# Patient Record
Sex: FEMALE | Race: WHITE | Employment: OTHER | URBAN - METROPOLITAN AREA
[De-identification: names, ages, dates, MRNs, and addresses within clinical notes are randomized per-mention and may not be internally consistent; named-entity substitution may affect disease eponyms.]

---

## 2021-12-03 ENCOUNTER — HOSPITAL ENCOUNTER (OUTPATIENT)
Facility: HOSPITAL | Age: 66
Setting detail: OBSERVATION
Discharge: HOME | End: 2021-12-05
Attending: EMERGENCY MEDICINE | Admitting: INTERNAL MEDICINE
Payer: MEDICARE

## 2021-12-03 ENCOUNTER — APPOINTMENT (OUTPATIENT)
Dept: CT IMAGING | Facility: HOSPITAL | Age: 66
End: 2021-12-03
Attending: EMERGENCY MEDICINE
Payer: MEDICARE

## 2021-12-03 DIAGNOSIS — R47.81 SLURRED SPEECH: ICD-10-CM

## 2021-12-03 DIAGNOSIS — R20.0 LEFT FACIAL NUMBNESS: Primary | ICD-10-CM

## 2021-12-03 PROCEDURE — 70450 CT HEAD/BRAIN W/O DYE: CPT | Performed by: EMERGENCY MEDICINE

## 2021-12-04 ENCOUNTER — APPOINTMENT (OUTPATIENT)
Dept: MRI IMAGING | Facility: HOSPITAL | Age: 66
End: 2021-12-04
Attending: EMERGENCY MEDICINE
Payer: MEDICARE

## 2021-12-04 ENCOUNTER — APPOINTMENT (OUTPATIENT)
Dept: CT IMAGING | Facility: HOSPITAL | Age: 66
End: 2021-12-04
Attending: Other
Payer: MEDICARE

## 2021-12-04 PROBLEM — R47.81 SLURRED SPEECH: Status: ACTIVE | Noted: 2021-12-04

## 2021-12-04 PROCEDURE — 99223 1ST HOSP IP/OBS HIGH 75: CPT | Performed by: OTHER

## 2021-12-04 PROCEDURE — 70553 MRI BRAIN STEM W/O & W/DYE: CPT | Performed by: EMERGENCY MEDICINE

## 2021-12-04 PROCEDURE — 70498 CT ANGIOGRAPHY NECK: CPT | Performed by: OTHER

## 2021-12-04 PROCEDURE — 70496 CT ANGIOGRAPHY HEAD: CPT | Performed by: OTHER

## 2021-12-04 RX ORDER — DOCUSATE SODIUM 100 MG/1
100 CAPSULE, LIQUID FILLED ORAL 2 TIMES DAILY PRN
Status: DISCONTINUED | OUTPATIENT
Start: 2021-12-04 | End: 2021-12-05

## 2021-12-04 RX ORDER — ACETAMINOPHEN 325 MG/1
650 TABLET ORAL EVERY 6 HOURS PRN
Status: DISCONTINUED | OUTPATIENT
Start: 2021-12-04 | End: 2021-12-05

## 2021-12-04 RX ORDER — ATORVASTATIN CALCIUM 20 MG/1
20 TABLET, FILM COATED ORAL NIGHTLY
Status: ON HOLD | COMMUNITY
End: 2021-12-05

## 2021-12-04 RX ORDER — ASPIRIN 325 MG
325 TABLET ORAL NIGHTLY
Status: DISCONTINUED | OUTPATIENT
Start: 2021-12-04 | End: 2021-12-04

## 2021-12-04 RX ORDER — MIDAZOLAM HYDROCHLORIDE 10 MG/2ML
2.5 INJECTION, SOLUTION INTRAMUSCULAR; INTRAVENOUS ONCE
Status: COMPLETED | OUTPATIENT
Start: 2021-12-04 | End: 2021-12-04

## 2021-12-04 RX ORDER — ASPIRIN 300 MG
300 SUPPOSITORY, RECTAL RECTAL NIGHTLY
Status: DISCONTINUED | OUTPATIENT
Start: 2021-12-04 | End: 2021-12-04

## 2021-12-04 RX ORDER — ACETAMINOPHEN 650 MG/1
650 SUPPOSITORY RECTAL EVERY 4 HOURS PRN
Status: DISCONTINUED | OUTPATIENT
Start: 2021-12-04 | End: 2021-12-05

## 2021-12-04 RX ORDER — METOCLOPRAMIDE HYDROCHLORIDE 5 MG/ML
10 INJECTION INTRAMUSCULAR; INTRAVENOUS EVERY 8 HOURS PRN
Status: DISCONTINUED | OUTPATIENT
Start: 2021-12-04 | End: 2021-12-05

## 2021-12-04 RX ORDER — HYDRALAZINE HYDROCHLORIDE 20 MG/ML
10 INJECTION INTRAMUSCULAR; INTRAVENOUS EVERY 2 HOUR PRN
Status: DISCONTINUED | OUTPATIENT
Start: 2021-12-04 | End: 2021-12-05

## 2021-12-04 RX ORDER — ASPIRIN 81 MG/1
81 TABLET, CHEWABLE ORAL DAILY
Status: DISCONTINUED | OUTPATIENT
Start: 2021-12-05 | End: 2021-12-05

## 2021-12-04 RX ORDER — EMPAGLIFLOZIN 25 MG/1
25 TABLET, FILM COATED ORAL DAILY
COMMUNITY

## 2021-12-04 RX ORDER — ACETAMINOPHEN 325 MG/1
650 TABLET ORAL EVERY 4 HOURS PRN
Status: DISCONTINUED | OUTPATIENT
Start: 2021-12-04 | End: 2021-12-05

## 2021-12-04 RX ORDER — DEXTROSE MONOHYDRATE 25 G/50ML
50 INJECTION, SOLUTION INTRAVENOUS
Status: DISCONTINUED | OUTPATIENT
Start: 2021-12-04 | End: 2021-12-05

## 2021-12-04 RX ORDER — SODIUM CHLORIDE 9 MG/ML
INJECTION, SOLUTION INTRAVENOUS CONTINUOUS
Status: DISCONTINUED | OUTPATIENT
Start: 2021-12-04 | End: 2021-12-04

## 2021-12-04 RX ORDER — ONDANSETRON 2 MG/ML
4 INJECTION INTRAMUSCULAR; INTRAVENOUS EVERY 6 HOURS PRN
Status: DISCONTINUED | OUTPATIENT
Start: 2021-12-04 | End: 2021-12-05

## 2021-12-04 RX ORDER — ATORVASTATIN CALCIUM 80 MG/1
80 TABLET, FILM COATED ORAL NIGHTLY
Status: DISCONTINUED | OUTPATIENT
Start: 2021-12-04 | End: 2021-12-05

## 2021-12-04 RX ORDER — ASPIRIN 81 MG/1
81 TABLET, CHEWABLE ORAL DAILY
Refills: 0 | Status: SHIPPED | COMMUNITY
Start: 2021-12-05 | End: 2021-12-05

## 2021-12-04 RX ORDER — LABETALOL HYDROCHLORIDE 5 MG/ML
10 INJECTION, SOLUTION INTRAVENOUS EVERY 10 MIN PRN
Status: DISCONTINUED | OUTPATIENT
Start: 2021-12-04 | End: 2021-12-05

## 2021-12-04 NOTE — H&P
[facility] Hospitalist H&P       CC: numbness, weakness     PCP: Villa Anaya MD    History of Present Illness:   77year old with history of diabetes mellitus type 2 on oral hypoglycemics, hyperlipidemia, hx of hypertension but now off me distress  Neck: non tender, no adenopathy   Lungs: clear to ausculation bilaterally, no wheezing  Heart: Regular rate and rhythm  Abdomen: soft, non tender, non distended   Extremities: No edema  Skin: no new rash, normal color  Neuro: CN II-XII intact, 5/ hypertension but now off medications, who presents for evaluation of left facial numbness, possible slurred speech, left arm parasthesias, but also right arm as well, and bilateral LE.      Parasthesias, however multi-focal and not one sided  -after further

## 2021-12-04 NOTE — PLAN OF CARE
Shy Purcell is alert and oriented. Admission completed. On RA. Voiding freely. Headache pain complaints this am, aspirin was given earlier with good results. IVF running per order.  Neuros q2H, no deficits besides tingling on left side of face and left leg and seizures  Description: INTERVENTIONS  - Monitor for seizure activity  - Administer anti-seizure medications as ordered  - Monitor neurological status  Outcome: Progressing  Goal: Remains free of injury related to seizure activity  Description: INTERVENTION

## 2021-12-04 NOTE — OCCUPATIONAL THERAPY NOTE
OCCUPATIONAL THERAPY EVALUATION - INPATIENT     Room Number: 340/340-A  Evaluation Date: 12/4/2021  Type of Evaluation: Initial  Presenting Problem: L facial numnbess    Physician Order: IP Consult to Occupational Therapy  Reason for Therapy: ADL/IADL Dysf Activity Short Form. Research supports that patients with this level of impairment may benefit from out patient OT.      DISCHARGE RECOMMENDATIONS  OT Discharge Recommendations: Outpatient OT;24 hour care/supervision  OT Device Recommendations: TBD    PLAN Perception Status:   WFL - within functional limits    SENSATION  Pt able to note when touched BUE; reported parestheias as described above    Communication: Pt was able to answer questions and provide hx    Behavioral/Emotional/Social: Pt cont' to concern within reach;RN aware of session/findings; All patient questions and concerns addressed; Alarm set    OT Goals  Patients self stated goal is: return home     Patient will complete functional transfer with mod I  Comment:     Patient will complete LE dressing

## 2021-12-04 NOTE — ED PROVIDER NOTES
Patient Seen in: [facility] AND [facility] Emergency Department    History   Patient presents with:  Numbness Weakness      HPI    51-year-old female presents the ER with complaint of left facial numbness and tingling and slurring of her speech that started at 5 P mask, eye protection, and gloves were worn throughout the duration of the exam.  Handwashing was performed prior to and after the exam.  Stethoscope and any equipment used during my examination was cleaned with super sani-cloth germicidal wipes following t Abnormal; Notable for the following components:    HgbA1C 7.3 (*)     Estimated Average Glucose 163 (*)     All other components within normal limits   POCT GLUCOSE - Abnormal; Notable for the following components:    POC Glucose  130 (*)     All other com individual orders. SCAN SLIDE   MD BLOOD SMEAR CONSULT   RAINBOW DRAW GOLD     EKG    Rate, intervals and axes as noted on EKG Report. Rate: 78  Rhythm: Sinus Rhythm  Reading: No ST deviation.              Imaging Results Available and Reviewed while in The patient already has does not have any pertinent problems on file. to contribute to the complexity of this ED evaluation.     ED Course: 75-year-old female presents the ER with complaint of left-sided facial tingling and slurring of her speech which is s

## 2021-12-04 NOTE — PHYSICAL THERAPY NOTE
PHYSICAL THERAPY EVALUATION - INPATIENT     Room Number: 340/340-A  Evaluation Date: 12/4/2021  Type of Evaluation: Initial   Physician Order: PT Eval and Treat    Presenting Problem: L facial numbness     Reason for Therapy: Mobility Dysfunction and Disc Speech normal but feels slow in cog, motor function. MRI, CT show old CVA. Problem List  Principal Problem:    Left facial numbness  Active Problems:    Slurred speech      Past Medical History  History reviewed. No pertinent past medical history. How much help from another person does the patient currently need. ..    Help from Another: Moving to and from a bed to a chair (including a wheelchair)?: None   Help from Another: Need to walk in hospital room?: None   Help from Another: Climbing 3-5 step

## 2021-12-04 NOTE — CONSULTS
[facility]  [address] [facility]  [phone]          [facility] [address]    Report of Consultation    Gricel Horse Patient Status:  Inpatient     1955 MRN [id number] slowly. She had \"sharp pain on the top of her head. \"  She had \"pinchingness\" on the left occipital region, left temporal, and left orbital.  daughter reports a left ptosis. She reports a dull pain that never goes away.      She had a headache for ab H&P    Prior stroke/TIAs (date, description):       Date  Topography/symptoms  Etiology      Vascular Risk Factors:   Diabetes mellitus  Dyslipidemia  Hypertension  Physical inactivity      ROS:  Pertinent positive and negatives per HPI.   All others were r @FLOWCHS(14)@    Exam:  - General: appears stated age and no distress  - CV: Normal S1-S2   Carotids:   - Pulmonary: no signs of respiratory distress. Normal excursion of the chest.   Neurologic Exam  - Mental Status: Alert and attentive.  person, place release signs:Not assessed.     Jaw Jerk:    Wilmer's sign:absent   Nonsustained clonus: Absent   Sustained clonus: Absent   - Sensory:   Light touch: normal  Temperature: normal  Pinprick:   Vibration: normal  Proprioception:      Sensory level:      Rom right greater than left-sided paresthesias, headache, reported left ptosis, and slow speech found to have subacute infarct in her left temporoparietal region and left sylvian fissure/operculum.   Currently her exam is nonfocal.  She denies having a headache 80; could be on atorvastatin 20; LDL is < 70.  3. BP goal:   4. Permissive hypertension. Treat systolic blood pressures unless > 220/110  5. Additional brain and vascular imaging ordered:   a.  CTA  Head/neck  b. TTE  c. EEG  6.  Additional labs ordered: support charting by exception. Sections left blank in a completed note should be presumed not to have been done.     Level of complexity was based on - interviewing, examining the patient, establishing a plan of care, as well as review of all neuroimaging

## 2021-12-04 NOTE — ED QUICK NOTES
Orders for admission, patient is aware of plan and ready to go upstairs. Any questions, please call ED RN racheal  at extension [phone].    Type of COVID test sent: rapid  COVID Suspicion level: Low    LOC at time of transport: a/o x3/3    Other pertinent inform

## 2021-12-04 NOTE — SLP NOTE
ADULT SWALLOWING EVALUATION    ASSESSMENT    ASSESSMENT/OVERALL IMPRESSION:    Proper PPE worn. Hands sanitized upon entrance/exit Pt room. This BSE was ordered d/t Pt admitted with (L) facial numbness and report of \"slurred speech. \". Pt on solid/t Sp02 was ~97 % during this assessment. IMPRESSIONS:    Pt presents with functional oral swallow and possible pharyngeal dysfunction. Per [facility] JULIANNA Scale, Pt's swallow function is Level 7 of 7 (f/u required for reinforcement of swallowing precautions).  Col Functional Limits  Range of Motion: Within Functional Limits  Rate of Motion: Within Functional Limits    Voice Quality: Clear  Respiratory Status: Unlabored (room air)  Consistencies Trialed: Thin liquids; Hard solid  Method of Presentation: Self presentat

## 2021-12-05 ENCOUNTER — APPOINTMENT (OUTPATIENT)
Dept: CV DIAGNOSTICS | Facility: HOSPITAL | Age: 66
End: 2021-12-05
Attending: HOSPITALIST
Payer: MEDICARE

## 2021-12-05 VITALS
OXYGEN SATURATION: 96 % | TEMPERATURE: 98 F | HEART RATE: 65 BPM | SYSTOLIC BLOOD PRESSURE: 124 MMHG | WEIGHT: 150.81 LBS | DIASTOLIC BLOOD PRESSURE: 60 MMHG | RESPIRATION RATE: 14 BRPM | BODY MASS INDEX: 25.75 KG/M2 | HEIGHT: 64 IN

## 2021-12-05 PROCEDURE — 93306 TTE W/DOPPLER COMPLETE: CPT | Performed by: HOSPITALIST

## 2021-12-05 PROCEDURE — 99233 SBSQ HOSP IP/OBS HIGH 50: CPT | Performed by: OTHER

## 2021-12-05 RX ORDER — ATORVASTATIN CALCIUM 80 MG/1
80 TABLET, FILM COATED ORAL NIGHTLY
Qty: 90 TABLET | Refills: 0 | Status: SHIPPED | OUTPATIENT
Start: 2021-12-05

## 2021-12-05 RX ORDER — ASPIRIN 81 MG/1
81 TABLET, CHEWABLE ORAL DAILY
Qty: 90 TABLET | Refills: 0 | Status: SHIPPED | OUTPATIENT
Start: 2021-12-05

## 2021-12-05 RX ORDER — CLOPIDOGREL BISULFATE 75 MG/1
75 TABLET ORAL DAILY
Qty: 90 TABLET | Refills: 0 | Status: SHIPPED | OUTPATIENT
Start: 2021-12-05 | End: 2021-12-24

## 2021-12-05 RX ORDER — CLOPIDOGREL BISULFATE 75 MG/1
75 TABLET ORAL DAILY
Status: DISCONTINUED | OUTPATIENT
Start: 2021-12-05 | End: 2021-12-05

## 2021-12-05 NOTE — PROGRESS NOTES
Initially was convinced that the patient would need a carotid enterectomy or carotid stent. However, given how old her prior infarcts are she may be a candidate for medical management.     I will see her in clinic the week of the 13th through The 17th and

## 2021-12-05 NOTE — CONSULTS
Fareed Alvarez MD.  [facility]   Vascular and Endovascular Surgery     VASCULAR SURGERY   CONSULT NOTE      Name: Shabbir Rogers   :     [id number]     2021       REFERRING PHYSICIAN: Mejia Love DO  PRIMARY CARE PHYSICIAN: acetaminophen (TYLENOL) tab 650 mg, 650 mg, Oral, Q6H PRN  •  glucose (DEX4) oral liquid 15 g, 15 g, Oral, Q15 Min PRN **OR** glucose-vitamin C (DEX-4) chewable tab 4 tablet, 4 tablet, Oral, Q15 Min PRN **OR** dextrose 50 % injection 50 mL, 50 mL, Intraven    CO2 27.0   BUN 11   CREATSERUM 0.88   *   CA 9.9     Recent Labs   Lab 12/03/21  2250   PTP 12.3   INR 0.93   PTT 28.7     No results for input(s): ALT, AST, ALB, AMYLASE, LIPASE, LDH in the last 168 hours.     Invalid input(s): JOSE TB hemorrhage. 2. Chronic left posterior parietal occipital cortical infarct. 3. Chronic left basal ganglionic lacunar infarct related to small vessel disease. 4. Mild changes of chronic small vessel disease in cerebral white matter.  5. Large vessel intracran left parietal lobe cortical infarcts and small chronic left posterior temporal operculum infarct. 3. Mild changes of chronic small vessel disease in cerebral white matter. 4. Chronic left basal ganglionic lacunar infarcts related to small vessel disease.  5 stenosis. No visible aneurysm or vascular malformation.   OTHER FINDINGS:    Chronic ischemic change and volume loss/encephalomalacia in the posterior-lateral aspect of the left temporal-occipital lobe adjacent to the lateral ventricle similar to prior MR 1. Left ventricle: The cavity size was normal. Wall thickness was    normal. Systolic function was normal. The estimated ejection    fraction was 55-60%, by visual assessment. Wall motion was    normal; there were no regional wall motion abnormalities.  Lef regurgitation. Peak gradient (D): 3mm Hg. Left atrium:  The atrium was normal in size. Right ventricle: The cavity size was normal. Wall thickness was normal. Systolic function was normal. Pulmonic valve: Well visualized.   The valve appears to be soto Aorta                                     Value        Reference  Aortic root ID                            3.0   cm     &lt;4.0  Aortic root ID, STJ, ED                   2.6   cm     2.0 - 3.2  Descending aorta ID                       2.7   cm     ----- 12/5/2021  PROCEDURE: CTA CAROTID ARTERIES (CPT=70498)  COMPARISON: None. INDICATIONS: left ICA stenosis.   TECHNIQUE:   CT images of the neck were obtained with non-ionic intravenous contrast material. Multi-planar reformatted/3-D images were created to o aortic arch with aneurysm or dissection. MEDIASTINUM/APICES (Limited): Minimal dependent subsegmental atelectasis in the lung bases. OTHER: The visualized soft tissues of the neck are also unremarkable. CONCLUSION:  1.   Atherosclerotic plaq Posterior Auricular Interpolation Flap Text: A decision was made to reconstruct the defect utilizing an interpolation axial flap and a staged reconstruction.  A telfa template was made of the defect.  This telfa template was then used to outline the posterior auricular interpolation flap.  The donor area for the pedicle flap was then injected with anesthesia.  The flap was excised through the skin and subcutaneous tissue down to the layer of the underlying musculature.  The pedicle flap was carefully excised within this deep plane to maintain its blood supply.  The edges of the donor site were undermined.   The donor site was closed in a primary fashion.  The pedicle was then rotated into position and sutured.  Once the tube was sutured into place, adequate blood supply was confirmed with blanching and refill.  The pedicle was then wrapped with xeroform gauze and dressed appropriately with a telfa and gauze bandage to ensure continued blood supply and protect the attached pedicle.

## 2021-12-05 NOTE — PLAN OF CARE
Dori Huff is resting comfortably in bed, alert and oriented x 4, on room air, on tele, Accucheck ACHS, ambulating, headaches controlled with acetaminophen, neuro checks q4h, SCDS for DVT prophylaxis, IV fluids discontinued, plan for 2D echo tomorrow and CTA, interventions as ordered  Outcome: Progressing  Goal: Absence of seizures  Description: INTERVENTIONS  - Monitor for seizure activity  - Administer anti-seizure medications as ordered  - Monitor neurological status  Outcome: Progressing  Goal: Remains free

## 2021-12-05 NOTE — PROGRESS NOTES
[facility]  [address] [facility], [address] [place] [address]  [phone]        INPATIENT STROKE NEUROLOGY   FOLLOW UP PROGRESS NOTE  [address]RAY[address] Khalil Patient Status:  Inpatient     1955 MRN  carotid stent. However, given how old her prior infarcts are she may be a candidate for medical management.   I spent approximately an hour in the room with the family reviewing the evidence about carotid endarterectomy performed outside of 2 weeks of the for 90 days for a potentially active/dynamic source of stroke  7. Lipid-lowering agents: Atorvastatin   8. Avoid hypotonic fluids as this can worsen cerebral edema. 9. Physical therapy, Occupational Therapy, speech therapy evaluations ordered.   Dhiraj Patterson speech. pertinent positive and negatives per HPI. All others were reviewed and negative. Objective   OBJECTIVE:   Last vitals and weight :  Blood pressure 124/60, pulse 65, temperature 97.9 °F (36.6 °C), temperature source Oral, resp.  rate 14, dominant hand is slightly faster. Foot Taps: Foot taps are symmetric. Leg Drift:      Reflexes:    C5 C6 C7  L4 S1   R 0 0  0 0   L 0 0  0 0   Adductor Spread: No adductor spread noted.      Nonsustained clonus: Absent   Sustained clonus: Absent   Minus Favor 12.3 11.8 - 14.5 seconds    INR 0.93 0.90 - 1.20   PTT, Activated    Collection Time: 12/03/21 10:50 PM   Result Value Ref Range    PTT 28.7 23.2 - 35.3 seconds   CBC W/ DIFFERENTIAL    Collection Time: 12/03/21 10:50 PM   Result Value Ref Range    WBC 10. left-to-right cardiac shunt, renal disease (hydronephrosis, cystic renal disease), several tumors (uterine leiomyoma, adenocarcinoma of the kidney, hepatocellular carcinoma, pheochromocytoma, cerebellar hemangioblastoma), increased plasma volume and myelop Value Ref Range    Cholesterol, Total 154 <200 mg/dL    HDL Cholesterol 76 (H) 40 - 59 mg/dL    Triglycerides 69 30 - 149 mg/dL    LDL Cholesterol 64 <100 mg/dL    VLDL 10 0 - 30 mg/dL    Non HDL Chol 78 <130 mg/dL   POCT Glucose    Collection Time: 12/04/ (UJK=[id number])    Result Date: 12/5/2021  CONCLUSION:  1. Moderate-severe atherosclerotic narrowing in the left internal carotid artery. Otherwise no he medically significant stenosis, occlusion, or gross aneurysm of the anterior or posterior circulation.  2 to note above. Evaluation/Outcome: Verbalized understanding    Disclaimer: This record was dictated using [address]oit. There may be errors due to voice recognition problems that were not realized and corrected during the completion of the note.

## 2021-12-05 NOTE — PLAN OF CARE
Assumed care from [facility]. Pt a/ox4 no complaints of pain. Ambulating independently. Continuing neuro assessments per protocol. Per vascular surgery and neuro, patient cleared for discharge.  Education complete, discharge instructions gone over with sidra Progressing  Goal: Absence of seizures  Description: INTERVENTIONS  - Monitor for seizure activity  - Administer anti-seizure medications as ordered  - Monitor neurological status  Outcome: Progressing  Goal: Remains free of injury related to seizure activ

## 2021-12-05 NOTE — PLAN OF CARE
Patient alert and oriented, no acute events overnight. Educated patient to call for assistance, call light within reach.     Problem: Patient Centered Care  Goal: Patient preferences are identified and integrated in the patient's plan of care  Description: related to seizure activity  Description: INTERVENTIONS:  - Maintain airway, patient safety  and administer oxygen as ordered  - Monitor patient for seizure activity, document and report duration and description of seizure to MD/LIP  - If seizure occurs, t

## 2021-12-05 NOTE — PROGRESS NOTES
[facility] Hospitalist Progress Note     CC: Hospital Follow up    PCP: Demarcus Arreola MD       Assessment/Plan:   77year old with history of diabetes mellitus type 2 on oral hypoglycemics, hyperlipidemia, hx of hypertension but now off medic Weights  12/05/21 0701 : 150 lb 12.8 oz (68.4 kg)  12/04/21 0242 : 154 lb 14.4 oz (70.3 kg)  12/03/21 2246 : 160 lb (72.6 kg)  12/03/21 2358 : 160 lb 0.9 oz (72.6 kg)      /82 (BP Location: Right arm)   Pulse 71   Temp 98.3 °F (36.8 °C) (Oral)   Resp vessel disease in cerebral white matter. 4. Chronic left basal ganglionic lacunar infarcts related to small vessel disease. 5. Narrowing of left cavernous carotid artery related to densely calcified plaque.  6. No major discrepancy with preliminary Vision r Labetalol HCl, hydrALAzine HCl, docusate sodium, ondansetron **OR** metoclopramide, acetaminophen, glucose **OR** glucose-vitamin C **OR** dextrose **OR** glucose **OR** glucose-vitamin C, iopamidol    Roxanna Gamboa DO

## 2021-12-05 NOTE — PLAN OF CARE
Francis Catalan is resting in bed, alert and oriented x 4, on room air, ambulating, voiding freely, Accucheck ACHS, neuro q4h, c/o mild pressure on head, PRN acetaminophen adx, fall precautions in place, call light within easy reach, 2D Echo completed today, plan seizures  Description: INTERVENTIONS  - Monitor for seizure activity  - Administer anti-seizure medications as ordered  - Monitor neurological status  Outcome: Progressing  Goal: Remains free of injury related to seizure activity  Description: INTERVENTION

## 2021-12-06 ENCOUNTER — TELEPHONE (OUTPATIENT)
Dept: NEUROLOGY | Facility: CLINIC | Age: 66
End: 2021-12-06

## 2021-12-06 NOTE — DISCHARGE SUMMARY
General Medicine Discharge Summary     Patient ID:  Alysa Jamison  77year old  8/18/1955    Admit date: 12/3/2021    Discharge date and time: 12/5/2021  6:55 PM     Attending Physician: Jori Jeronimo mellitus type 2  -resume her oral hypoglycemics  -HbA1c 7.3%     Hypertension  -was taken off meds about a year or two ago she states  -BP here has been stable    Consults:   IP CONSULT TO NEUROLOGY  IP CONSULT TO VASCULAR SURGERY    Patient instructions:

## 2021-12-07 NOTE — PAYOR COMM NOTE
--------------  ADMISSION REVIEW     Payor: [facility] [place] #:  [id number]  Authorization Number: [id number]       ED Provider Notes        History   Patient presents with:  Numbness Weakness      HPI    15-year-old female presents th Status: She is alert and oriented to person, place, and time. Cranial Nerves: No cranial nerve deficit, dysarthria or facial asymmetry. Sensory: No sensory deficit. Motor: No weakness.       Deep Tendon Reflexes: Reflexes are normal and symme differentiation and edema, potential representing a new (acute versus subacute) ischemic infarct (series 2, image 20 and 23). Recommend further evaluation with brain MRI.     ED Medications Administered: Medications - No data to display      MDM      12/03 yesterday around 500pm. She first went to the immediate care center, then was told to go to the ER. She denies any trouble swallowing. No vision changes.  When asked first about her symptoms she described more of the facial symptoms, the whole left side, bu headache currently, but had a headache yesterday. Overall her exam is nonfocal.  She has diffuse hyporeflexia and questionable left drift.     Etiology of her left-sided paresthesias is most likely due to migraine aura.   Her new headaches been ongoing for a. LDL and A1c  7. Dysphagia status:   a. Based on RN swallow eval.  8. Fluids/nutrition:   a. Avoid hypotonic fluids. 9. DVT prophylaxis:   a. Chemical ppx w/ lovenox and scds  10.  Therapy/rehab services ordered (speech/PT/OT/rehab consult):   a. Order need a carotid enterectomy or carotid stent.   However, given how old her prior infarcts are she may be a candidate for medical management.     I will see her in clinic the week of the 13th through The 17th and at that time we can discuss whether she  Shoul

## 2021-12-14 ENCOUNTER — OFFICE VISIT (OUTPATIENT)
Dept: NEUROLOGY | Facility: CLINIC | Age: 66
End: 2021-12-14
Payer: COMMERCIAL

## 2021-12-14 VITALS
SYSTOLIC BLOOD PRESSURE: 130 MMHG | OXYGEN SATURATION: 99 % | BODY MASS INDEX: 25.61 KG/M2 | WEIGHT: 150 LBS | HEIGHT: 64 IN | DIASTOLIC BLOOD PRESSURE: 72 MMHG | HEART RATE: 70 BPM

## 2021-12-14 DIAGNOSIS — I65.22 STENOSIS OF LEFT INTERNAL CAROTID ARTERY: Primary | ICD-10-CM

## 2021-12-14 PROCEDURE — 3078F DIAST BP <80 MM HG: CPT | Performed by: OTHER

## 2021-12-14 PROCEDURE — 3075F SYST BP GE 130 - 139MM HG: CPT | Performed by: OTHER

## 2021-12-14 PROCEDURE — 3008F BODY MASS INDEX DOCD: CPT | Performed by: OTHER

## 2021-12-14 PROCEDURE — 99214 OFFICE O/P EST MOD 30 MIN: CPT | Performed by: OTHER

## 2021-12-14 PROCEDURE — 1111F DSCHRG MED/CURRENT MED MERGE: CPT | Performed by: OTHER

## 2021-12-15 NOTE — PROGRESS NOTES
[facility] 37  [address]  [phone]        Neurology Clinic Follow Up Note    Chief Complaint:  Neurologic Problem (New patient here due to ER Visit on 12/3 for left facial numbness and slurred may have similar benefit from medical management alone without the risk of surgical intervention. While she was admitted I discussed this with the patient and her children for approximately an hour.   She now presents in follow-up to further evaluate the n Mental Status: Alert and attentive. Oriented x4. Speech is spontaneous, fluent, and prosodic. Comprehension and repetition intact.  Phrase length and rate are normal. No paraphasic errors, neologisms, anomia, acalculia, apraxia, anosognosia, or R/L confus neck  Imaging revealed:   Approximately 75% stenosis in the left ICA. There is decreased flow throughout the course of the left ICA. 55 to 70% stenosis in the right ICA.   Subacute to chronic infarcts/T2 flair hyperintensities in the left MCA internal bor 130/80. HbA1c goal <7.0  LDL-C goal  <70 mg/dL. Frequent exercise as per AHA/ASA recs (30 min of aerobic exercise, 5 times per week). Given that she snores consider sleep study at next visit. Recommend DASH diet.  A diet that is rich in fruits and vege of care on the above. Thank you for allowing me to participate in the care of your patient.     Diogo Dubon DO  12/14/2021

## 2021-12-19 ENCOUNTER — LAB ENCOUNTER (OUTPATIENT)
Dept: LAB | Facility: HOSPITAL | Age: 66
DRG: 039 | End: 2021-12-19
Attending: SURGERY
Payer: MEDICARE

## 2021-12-19 DIAGNOSIS — Z01.818 PREOPERATIVE TESTING: ICD-10-CM

## 2021-12-19 PROCEDURE — 87641 MR-STAPH DNA AMP PROBE: CPT

## 2021-12-20 LAB
MRSA DNA SPEC QL NAA+PROBE: NEGATIVE
SARS-COV-2 RNA RESP QL NAA+PROBE: NOT DETECTED

## 2021-12-20 RX ORDER — LISINOPRIL 10 MG/1
10 TABLET ORAL EVERY MORNING
COMMUNITY

## 2021-12-22 ENCOUNTER — ANESTHESIA (OUTPATIENT)
Dept: SURGERY | Facility: HOSPITAL | Age: 66
DRG: 039 | End: 2021-12-22
Payer: MEDICARE

## 2021-12-22 ENCOUNTER — ANESTHESIA EVENT (OUTPATIENT)
Dept: SURGERY | Facility: HOSPITAL | Age: 66
DRG: 039 | End: 2021-12-22
Payer: MEDICARE

## 2021-12-22 ENCOUNTER — HOSPITAL ENCOUNTER (INPATIENT)
Facility: HOSPITAL | Age: 66
LOS: 2 days | Discharge: HOME | DRG: 039 | End: 2021-12-24
Attending: SURGERY | Admitting: SURGERY
Payer: MEDICARE

## 2021-12-22 DIAGNOSIS — Z01.818 PREOPERATIVE TESTING: Primary | ICD-10-CM

## 2021-12-22 PROBLEM — I65.22 CAROTID STENOSIS, ASYMPTOMATIC, LEFT: Status: ACTIVE | Noted: 2021-12-22

## 2021-12-22 LAB
GLUCOSE BLDC GLUCOMTR-MCNC: 128 MG/DL (ref 70–99)
GLUCOSE BLDC GLUCOMTR-MCNC: 146 MG/DL (ref 70–99)
GLUCOSE BLDC GLUCOMTR-MCNC: 158 MG/DL (ref 70–99)
GLUCOSE BLDC GLUCOMTR-MCNC: 175 MG/DL (ref 70–99)

## 2021-12-22 PROCEDURE — 03UJ0KZ SUPPLEMENT LEFT COMMON CAROTID ARTERY WITH NONAUTOLOGOUS TISSUE SUBSTITUTE, OPEN APPROACH: ICD-10-PCS | Performed by: SURGERY

## 2021-12-22 PROCEDURE — 03CJ0ZZ EXTIRPATION OF MATTER FROM LEFT COMMON CAROTID ARTERY, OPEN APPROACH: ICD-10-PCS | Performed by: SURGERY

## 2021-12-22 PROCEDURE — 85347 COAGULATION TIME ACTIVATED: CPT

## 2021-12-22 PROCEDURE — 82962 GLUCOSE BLOOD TEST: CPT

## 2021-12-22 DEVICE — XENOSURE BIOLOGIC PATCH, 0.8CM X 8CM, EIFU
Type: IMPLANTABLE DEVICE | Site: NECK | Status: FUNCTIONAL
Brand: XENOSURE BIOLOGIC PATCH

## 2021-12-22 RX ORDER — LABETALOL HYDROCHLORIDE 5 MG/ML
INJECTION, SOLUTION INTRAVENOUS AS NEEDED
Status: DISCONTINUED | OUTPATIENT
Start: 2021-12-22 | End: 2021-12-22 | Stop reason: SURG

## 2021-12-22 RX ORDER — DEXTROSE MONOHYDRATE 25 G/50ML
50 INJECTION, SOLUTION INTRAVENOUS
Status: DISCONTINUED | OUTPATIENT
Start: 2021-12-22 | End: 2021-12-22 | Stop reason: HOSPADM

## 2021-12-22 RX ORDER — LISINOPRIL 10 MG/1
10 TABLET ORAL EVERY MORNING
Status: DISCONTINUED | OUTPATIENT
Start: 2021-12-23 | End: 2021-12-24

## 2021-12-22 RX ORDER — SODIUM CHLORIDE, SODIUM LACTATE, POTASSIUM CHLORIDE, CALCIUM CHLORIDE 600; 310; 30; 20 MG/100ML; MG/100ML; MG/100ML; MG/100ML
INJECTION, SOLUTION INTRAVENOUS CONTINUOUS
Status: DISCONTINUED | OUTPATIENT
Start: 2021-12-22 | End: 2021-12-22

## 2021-12-22 RX ORDER — NALOXONE HYDROCHLORIDE 0.4 MG/ML
80 INJECTION, SOLUTION INTRAMUSCULAR; INTRAVENOUS; SUBCUTANEOUS AS NEEDED
Status: DISCONTINUED | OUTPATIENT
Start: 2021-12-22 | End: 2021-12-22 | Stop reason: HOSPADM

## 2021-12-22 RX ORDER — HYDROCODONE BITARTRATE AND ACETAMINOPHEN 5; 325 MG/1; MG/1
2 TABLET ORAL EVERY 4 HOURS PRN
Status: DISCONTINUED | OUTPATIENT
Start: 2021-12-22 | End: 2021-12-24

## 2021-12-22 RX ORDER — HALOPERIDOL 5 MG/ML
0.25 INJECTION INTRAMUSCULAR ONCE AS NEEDED
Status: DISCONTINUED | OUTPATIENT
Start: 2021-12-22 | End: 2021-12-22 | Stop reason: HOSPADM

## 2021-12-22 RX ORDER — LIDOCAINE HYDROCHLORIDE 10 MG/ML
INJECTION, SOLUTION EPIDURAL; INFILTRATION; INTRACAUDAL; PERINEURAL AS NEEDED
Status: DISCONTINUED | OUTPATIENT
Start: 2021-12-22 | End: 2021-12-22 | Stop reason: SURG

## 2021-12-22 RX ORDER — SODIUM CHLORIDE, SODIUM LACTATE, POTASSIUM CHLORIDE, CALCIUM CHLORIDE 600; 310; 30; 20 MG/100ML; MG/100ML; MG/100ML; MG/100ML
INJECTION, SOLUTION INTRAVENOUS CONTINUOUS
Status: DISCONTINUED | OUTPATIENT
Start: 2021-12-22 | End: 2021-12-22 | Stop reason: HOSPADM

## 2021-12-22 RX ORDER — DEXAMETHASONE SODIUM PHOSPHATE 4 MG/ML
VIAL (ML) INJECTION AS NEEDED
Status: DISCONTINUED | OUTPATIENT
Start: 2021-12-22 | End: 2021-12-22 | Stop reason: SURG

## 2021-12-22 RX ORDER — MORPHINE SULFATE 10 MG/ML
6 INJECTION, SOLUTION INTRAMUSCULAR; INTRAVENOUS EVERY 10 MIN PRN
Status: DISCONTINUED | OUTPATIENT
Start: 2021-12-22 | End: 2021-12-22 | Stop reason: HOSPADM

## 2021-12-22 RX ORDER — ONDANSETRON 2 MG/ML
INJECTION INTRAMUSCULAR; INTRAVENOUS AS NEEDED
Status: DISCONTINUED | OUTPATIENT
Start: 2021-12-22 | End: 2021-12-22 | Stop reason: SURG

## 2021-12-22 RX ORDER — CEFAZOLIN SODIUM/WATER 2 G/20 ML
2 SYRINGE (ML) INTRAVENOUS EVERY 8 HOURS
Status: COMPLETED | OUTPATIENT
Start: 2021-12-22 | End: 2021-12-23

## 2021-12-22 RX ORDER — HYDROCODONE BITARTRATE AND ACETAMINOPHEN 5; 325 MG/1; MG/1
1 TABLET ORAL AS NEEDED
Status: DISCONTINUED | OUTPATIENT
Start: 2021-12-22 | End: 2021-12-22 | Stop reason: HOSPADM

## 2021-12-22 RX ORDER — HYDROMORPHONE HYDROCHLORIDE 1 MG/ML
0.6 INJECTION, SOLUTION INTRAMUSCULAR; INTRAVENOUS; SUBCUTANEOUS EVERY 5 MIN PRN
Status: DISCONTINUED | OUTPATIENT
Start: 2021-12-22 | End: 2021-12-22 | Stop reason: HOSPADM

## 2021-12-22 RX ORDER — MORPHINE SULFATE 4 MG/ML
4 INJECTION, SOLUTION INTRAMUSCULAR; INTRAVENOUS EVERY 10 MIN PRN
Status: DISCONTINUED | OUTPATIENT
Start: 2021-12-22 | End: 2021-12-22 | Stop reason: HOSPADM

## 2021-12-22 RX ORDER — HYDROMORPHONE HYDROCHLORIDE 1 MG/ML
0.2 INJECTION, SOLUTION INTRAMUSCULAR; INTRAVENOUS; SUBCUTANEOUS EVERY 5 MIN PRN
Status: DISCONTINUED | OUTPATIENT
Start: 2021-12-22 | End: 2021-12-22 | Stop reason: HOSPADM

## 2021-12-22 RX ORDER — ATORVASTATIN CALCIUM 80 MG/1
80 TABLET, FILM COATED ORAL NIGHTLY
Status: DISCONTINUED | OUTPATIENT
Start: 2021-12-22 | End: 2021-12-24

## 2021-12-22 RX ORDER — HYDROCODONE BITARTRATE AND ACETAMINOPHEN 5; 325 MG/1; MG/1
1 TABLET ORAL EVERY 4 HOURS PRN
Status: DISCONTINUED | OUTPATIENT
Start: 2021-12-22 | End: 2021-12-24

## 2021-12-22 RX ORDER — ACETAMINOPHEN 325 MG/1
650 TABLET ORAL EVERY 4 HOURS PRN
Status: DISCONTINUED | OUTPATIENT
Start: 2021-12-22 | End: 2021-12-24

## 2021-12-22 RX ORDER — PROCHLORPERAZINE EDISYLATE 5 MG/ML
5 INJECTION INTRAMUSCULAR; INTRAVENOUS ONCE AS NEEDED
Status: DISCONTINUED | OUTPATIENT
Start: 2021-12-22 | End: 2021-12-22 | Stop reason: HOSPADM

## 2021-12-22 RX ORDER — HYDROMORPHONE HYDROCHLORIDE 1 MG/ML
0.4 INJECTION, SOLUTION INTRAMUSCULAR; INTRAVENOUS; SUBCUTANEOUS EVERY 5 MIN PRN
Status: DISCONTINUED | OUTPATIENT
Start: 2021-12-22 | End: 2021-12-22 | Stop reason: HOSPADM

## 2021-12-22 RX ORDER — ACETAMINOPHEN 500 MG
1000 TABLET ORAL ONCE
Status: COMPLETED | OUTPATIENT
Start: 2021-12-22 | End: 2021-12-22

## 2021-12-22 RX ORDER — LIDOCAINE HYDROCHLORIDE 10 MG/ML
INJECTION, SOLUTION EPIDURAL; INFILTRATION; INTRACAUDAL; PERINEURAL AS NEEDED
Status: DISCONTINUED | OUTPATIENT
Start: 2021-12-22 | End: 2021-12-22 | Stop reason: HOSPADM

## 2021-12-22 RX ORDER — HYDROCODONE BITARTRATE AND ACETAMINOPHEN 5; 325 MG/1; MG/1
2 TABLET ORAL AS NEEDED
Status: DISCONTINUED | OUTPATIENT
Start: 2021-12-22 | End: 2021-12-22 | Stop reason: HOSPADM

## 2021-12-22 RX ORDER — ASPIRIN 81 MG/1
81 TABLET, CHEWABLE ORAL DAILY
Status: DISCONTINUED | OUTPATIENT
Start: 2021-12-23 | End: 2021-12-24

## 2021-12-22 RX ORDER — DEXTROSE MONOHYDRATE 25 G/50ML
50 INJECTION, SOLUTION INTRAVENOUS
Status: DISCONTINUED | OUTPATIENT
Start: 2021-12-22 | End: 2021-12-24

## 2021-12-22 RX ORDER — ONDANSETRON 2 MG/ML
4 INJECTION INTRAMUSCULAR; INTRAVENOUS EVERY 6 HOURS PRN
Status: DISCONTINUED | OUTPATIENT
Start: 2021-12-22 | End: 2021-12-24

## 2021-12-22 RX ORDER — CEFAZOLIN SODIUM/WATER 2 G/20 ML
2 SYRINGE (ML) INTRAVENOUS ONCE
Status: COMPLETED | OUTPATIENT
Start: 2021-12-22 | End: 2021-12-22

## 2021-12-22 RX ORDER — ROCURONIUM BROMIDE 10 MG/ML
INJECTION, SOLUTION INTRAVENOUS AS NEEDED
Status: DISCONTINUED | OUTPATIENT
Start: 2021-12-22 | End: 2021-12-22 | Stop reason: SURG

## 2021-12-22 RX ORDER — MORPHINE SULFATE 4 MG/ML
2 INJECTION, SOLUTION INTRAMUSCULAR; INTRAVENOUS EVERY 10 MIN PRN
Status: DISCONTINUED | OUTPATIENT
Start: 2021-12-22 | End: 2021-12-22 | Stop reason: HOSPADM

## 2021-12-22 RX ORDER — ONDANSETRON 2 MG/ML
4 INJECTION INTRAMUSCULAR; INTRAVENOUS ONCE AS NEEDED
Status: DISCONTINUED | OUTPATIENT
Start: 2021-12-22 | End: 2021-12-22 | Stop reason: HOSPADM

## 2021-12-22 RX ORDER — HEPARIN SODIUM 1000 [USP'U]/ML
INJECTION, SOLUTION INTRAVENOUS; SUBCUTANEOUS AS NEEDED
Status: DISCONTINUED | OUTPATIENT
Start: 2021-12-22 | End: 2021-12-22 | Stop reason: SURG

## 2021-12-22 RX ORDER — SODIUM CHLORIDE 9 MG/ML
INJECTION, SOLUTION INTRAVENOUS CONTINUOUS
Status: DISCONTINUED | OUTPATIENT
Start: 2021-12-22 | End: 2021-12-24

## 2021-12-22 RX ORDER — PROTAMINE SULFATE 10 MG/ML
INJECTION, SOLUTION INTRAVENOUS AS NEEDED
Status: DISCONTINUED | OUTPATIENT
Start: 2021-12-22 | End: 2021-12-22 | Stop reason: SURG

## 2021-12-22 RX ORDER — HEPARIN SODIUM 5000 [USP'U]/ML
5000 INJECTION, SOLUTION INTRAVENOUS; SUBCUTANEOUS EVERY 8 HOURS SCHEDULED
Status: DISCONTINUED | OUTPATIENT
Start: 2021-12-22 | End: 2021-12-24

## 2021-12-22 RX ADMIN — HEPARIN SODIUM 7000 UNITS: 1000 INJECTION, SOLUTION INTRAVENOUS; SUBCUTANEOUS at 14:03:00

## 2021-12-22 RX ADMIN — LABETALOL HYDROCHLORIDE 5 MG: 5 INJECTION, SOLUTION INTRAVENOUS at 13:48:00

## 2021-12-22 RX ADMIN — CEFAZOLIN SODIUM/WATER 2 G: 2 G/20 ML SYRINGE (ML) INTRAVENOUS at 13:33:00

## 2021-12-22 RX ADMIN — PROTAMINE SULFATE 50 MG: 10 INJECTION, SOLUTION INTRAVENOUS at 14:46:00

## 2021-12-22 RX ADMIN — DEXAMETHASONE SODIUM PHOSPHATE 4 MG: 4 MG/ML VIAL (ML) INJECTION at 13:17:00

## 2021-12-22 RX ADMIN — LIDOCAINE HYDROCHLORIDE 50 MG: 10 INJECTION, SOLUTION EPIDURAL; INFILTRATION; INTRACAUDAL; PERINEURAL at 13:17:00

## 2021-12-22 RX ADMIN — ONDANSETRON 4 MG: 2 INJECTION INTRAMUSCULAR; INTRAVENOUS at 13:17:00

## 2021-12-22 RX ADMIN — ROCURONIUM BROMIDE 50 MG: 10 INJECTION, SOLUTION INTRAVENOUS at 13:17:00

## 2021-12-22 NOTE — OPERATIVE REPORT
[facility]     PATIENTS NAME: Diaz Demond  ATTENDING PHYSICIAN: Elba Fonseca MD  OPERATING PHYSICIAN: Rosangela Wang MD  CSN: [id number]     LOCATION:  OR  MRN: [id number]    YOB: 1955  ADMISSION DATE: 12/22/2021  OPERATION DATE: sternocleidomastoid muscle. The incision was deepened through the platysma using bovie electrocautery. The sternocleidomastoid muscle was retracted laterally. The internal jugular vein was identified.  Dissection along the medial border of the jugular vein with concentrated heparinized saline and re-clamped. The endarterectomized surface was gently irrigated with heparinized saline solution. All remaining free debris was removed with a fine forceps. The distal endarterectomy endpoint was inspected.  Tacking

## 2021-12-22 NOTE — ANESTHESIA PROCEDURE NOTES
Airway  Date/Time: 12/22/2021 1:20 PM  Urgency: Elective    Airway not difficult    General Information and Staff    Patient location during procedure: OR  Anesthesiologist: Ramandeep Dwyer MD  Performed: anesthesiologist     Indications and Patient Baudilio Laboy

## 2021-12-22 NOTE — ANESTHESIA POSTPROCEDURE EVALUATION
Patient: Marcus Tineo    Procedure Summary     Date: 12/22/21 Room / Location: [address] [place] [address] MAIN OR 17 / [address] [place] [address] MAIN OR    Anesthesia Start: 8060 Anesthesia Stop: 2927    Procedure: LEFT CAROTID ENDARTERECTOMY (Left Neck) Diagnosis: (left internal carotid artery sten

## 2021-12-22 NOTE — ANESTHESIA PREPROCEDURE EVALUATION
Anesthesia PreOp Note    HPI:     Aleah Frazier is a 77year old female who presents for preoperative consultation requested by:  Juan A Calero MD    Date of Surgery: 12/22/2021    Procedure(s):  LEFT CAROTID ENDARTERECTOMY  Indication: left internal escobedo (ANCEF/KEFZOL) 2 GM/20ML premix IV syringe 2 g, 2 g, Intravenous, Once, Susannah Daniels MD    No current [facility]-ordered outpatient medications on file. No Known Allergies    History reviewed. No pertinent family history.   Social History    Socioeconomic saturation is 99%.     12/20/21  1008 12/22/21  0952 12/22/21  1001   BP:   139/64   Pulse:   68   Resp:   16   Temp:   98.2 °F (36.8 °C)   TempSrc:   Oral   SpO2:   99%   Weight: 69.4 kg (153 lb) 68 kg (150 lb)    Height: 1.676 m (5' 6\") 1.651 m (5' 5\")

## 2021-12-22 NOTE — H&P
The H&P from 12/5/21 was reviewed by myself on 12/22/21 , and there has been no significant interval change. The procedure was discussed with the patient and/or legal representative including the potential risks, benefits, and side effects.  Reasonable alte suppository 650 mg, 650 mg, Rectal, Q4H PRN  •  Labetalol HCl (TRANDATE) injection 10 mg, 10 mg, Intravenous, Q10 Min PRN  •  hydrALAzine HCl (APRESOLINE) injection 10 mg, 10 mg, Intravenous, Q2H PRN  •  docusate sodium (COLACE) cap 100 mg, 100 mg, Oral, B Supple  RESPIRATORY: Normal work of breathing  CARDIO: RRR   ABDOMEN: Soft, non-tender, non-distended  BACK: Normal, no tenderness  SKIN: No rashes, warm and dry  MUSCULOSKELETAL: Strength 5/5 throughout, sensation intact  EXTREMITIES: No edema  VASCULAR: decreased attenuation in periventricular white matter. CEREBELLUM:     No edema, hemorrhage, mass, acute infarction, or significant atrophy. BRAINSTEM:            No edema, hemorrhage, mass, acute infarction, or significant atrophy.   CALVARIUM: Widened perivascular spaces in the mid brain. No edema, hemorrhage, mass or acute infarct. CSF SPACES:      No hydrocephalus, subarachnoid hemorrhage, or mass. SKULL:      No mass or other significant visible lesion.   SINUSES:           Limited views dem INTERNAL CAROTIDS:  Atherosclerosis in the cavernous and horizontal petrous segments of the left internal carotid artery, resulting in approximately 50-75% stenosis. Contralateral right internal carotid is unremarkable.  ANTERIOR CEREBRALS:         No sign complete spectral Doppler, color Doppler, and IV contrast ------------------------------------------------------------------- Soledad Faust 66 08/18/1955 H: [id number]                               E: [id number] Study date: D Inpatient. Study date:  Study date: 12/05/2021. Study time: 08:32 AM.  Location:  [facility]. ------------------------------------------------------------------- Findings Left ventricle:   The cavity size was normal. Wall thickness was normal. Systoli 2.4   cm     2.2 - 3.5  LV fx shortening, PLAX             (H)    47    %      27 - 45  LV PW thickness, ED                       0.9   cm     0.6 - 0.9  IVS/LV PW ratio, ED                       1            -----------  LV end-wu veins                            Value        Reference  Estimated CVP                             3     mm Hg  -----------   Right ventricle                           Value        Reference  RV ID, ED, PLAX                           2.5   cm     --------- resulting in height mild-moderate atherosclerotic narrowing. No hemodynamically significant stenosis or dissection.   LEFT INTERNAL CAROTID:         Approximately 2 cm segment of atherosclerotic plaque within the origin of the internal carotid artery exten Her symptoms are not all explained by her carotid stenosis. She does have some left sided encephalomalacia with ipsilateral ICA stenosis. I discussed the findings with Dr. Zhao Petersen as well.  We will likely plan for a left carotid endarterectomy later this wee

## 2021-12-22 NOTE — ANESTHESIA PROCEDURE NOTES
Arterial Line  Performed by: Janis Catalan MD  Authorized by: Janis Catalan MD     General Information and Staff    Procedure Start:   Anesthesiologist: Janis Catalan MD  Performed By:  Anesthesiologist  Patient Location:  OR  Indication: continuou

## 2021-12-22 NOTE — ANESTHESIA PROCEDURE NOTES
Peripheral IV  Inserted by: Funmi Giraldo MD    Placement  Needle size: 20 G  Laterality: right  Location: hand  Site prep: alcohol  Technique: anatomical landmarks  Attempts: 2

## 2021-12-22 NOTE — CONSULTS
[facility] Hospitalist Consult Note   PCP: Karen Morgan MD  Attending:  Reece aDvis MD  Reason for Consult: Post operative medical management  Date of surgery: 12/22/21   Surgery: Left Carotid Endarterectomy     History of Present Illness:   77year old Fam Hx  History reviewed. No pertinent family history.     OBJECTIVE:  /67   Pulse 91   Temp 98.2 °F (36.8 °C) (Oral)   Resp 11   Ht 5' 5\" (1.651 m)   Wt 150 lb (68 kg)   SpO2 97%   BMI 24.96 kg/m²   General: Alert, no acute distress  Neck: dressin

## 2021-12-23 LAB
ANION GAP SERPL CALC-SCNC: 9 MMOL/L (ref 0–18)
BUN BLD-MCNC: 11 MG/DL (ref 7–18)
BUN/CREAT SERPL: 17.5 (ref 10–20)
CALCIUM BLD-MCNC: 8.2 MG/DL (ref 8.5–10.1)
CHLORIDE SERPL-SCNC: 103 MMOL/L (ref 98–112)
CO2 SERPL-SCNC: 22 MMOL/L (ref 21–32)
CREAT BLD-MCNC: 0.63 MG/DL
DEPRECATED RDW RBC AUTO: 39 FL (ref 35.1–46.3)
ERYTHROCYTE [DISTWIDTH] IN BLOOD BY AUTOMATED COUNT: 12.2 % (ref 11–15)
GLUCOSE BLD-MCNC: 109 MG/DL (ref 70–99)
GLUCOSE BLDC GLUCOMTR-MCNC: 102 MG/DL (ref 70–99)
GLUCOSE BLDC GLUCOMTR-MCNC: 154 MG/DL (ref 70–99)
GLUCOSE BLDC GLUCOMTR-MCNC: 204 MG/DL (ref 70–99)
GLUCOSE BLDC GLUCOMTR-MCNC: 346 MG/DL (ref 70–99)
HCT VFR BLD AUTO: 37.3 %
HGB BLD-MCNC: 12.6 G/DL
MCH RBC QN AUTO: 29.5 PG (ref 26–34)
MCHC RBC AUTO-ENTMCNC: 33.8 G/DL (ref 31–37)
MCV RBC AUTO: 87.4 FL
OSMOLALITY SERPL CALC.SUM OF ELEC: 278 MOSM/KG (ref 275–295)
PLATELET # BLD AUTO: 198 10(3)UL (ref 150–450)
POTASSIUM SERPL-SCNC: 4.1 MMOL/L (ref 3.5–5.1)
RBC # BLD AUTO: 4.27 X10(6)UL
SODIUM SERPL-SCNC: 134 MMOL/L (ref 136–145)
WBC # BLD AUTO: 10.4 X10(3) UL (ref 4–11)

## 2021-12-23 PROCEDURE — 82962 GLUCOSE BLOOD TEST: CPT

## 2021-12-23 PROCEDURE — 85027 COMPLETE CBC AUTOMATED: CPT | Performed by: SURGERY

## 2021-12-23 PROCEDURE — 36592 COLLECT BLOOD FROM PICC: CPT

## 2021-12-23 PROCEDURE — 80048 BASIC METABOLIC PNL TOTAL CA: CPT | Performed by: SURGERY

## 2021-12-23 PROCEDURE — 93010 ELECTROCARDIOGRAM REPORT: CPT | Performed by: HOSPITALIST

## 2021-12-23 PROCEDURE — 93005 ELECTROCARDIOGRAM TRACING: CPT

## 2021-12-23 NOTE — PAYOR COMM NOTE
--------------  ADMISSION REVIEW     Payor: [facility] [place] #:  [id number]  Authorization Number:  [id number].     Admit date: 12/22/21  Admit time:  9:40 AM     REVIEW DOCUMENTATION:  H&P signed by Araseli Cohen MD at 12/22/2021  1: infection, recurrent stenosis, hyperperfusion syndrome and death among other complications. The patient understood and all questions were answered.     DESCRIPTION OF PROCEDURE: The patient was brought to the operating room.  The patient was placed in the arteriotomy was performed on the anterolateral surface of the common carotid artery with a #11 blade scalpel and extended into the internal carotid artery using a Pott’s scissors. No shunt was used The plaque had intra-plaque hemorrhage.  An endarterectomy layer was injected with 1% Lidocaine. The skin was closed a running subcuticular closure 4-0 Monocryl suture. A sterile dressing was applied.  The patient was awakened, noted to have no gross neurological deficits, and was moving all 4 extremities to comma injection     Date Action Dose Route User    12/22/2021 1403 Given 7,000 Units Intravenous Homero Doshi MD      Heparin Sodium (Porcine) 5000 UNIT/ML injection 5,000 Units     Date Action Dose Route User    12/23/2021 0601 Given 5,000 Units Subcutaneous New Bag 20 mcg/min Intravenous Adrienne Galvez MD      propofol (DIPRIVAN) injection     Date Action Dose Route User    12/22/2021 1317 Given 150 mg Intravenous Adrienne Galvez MD      Protamine Sulfate injection     Date Action Dose Route User    12/22/20 (Room air) —     12/22/21 1900 — 96 15 127/52 96 % — None (Room air) —     12/22/21 1800 — 96 19 144/70 — — Nasal cannula 4 L/min     12/22/21 1700 — 91 11 150/67 97 % — Nasal cannula 2 L/min     12/22/21 1632 — 85 15 145/68 100 % — Nasal cannula 2

## 2021-12-23 NOTE — PROGRESS NOTES
[facility] Hospitalist Progress Note     CC: Hospital Follow up    PCP: Vineet Antonio MD       Assessment/Plan:   77year old female diabetes mellitus type 2 on oral hypoglycemics, hyperlipidemia, hx of HTN, recent TIA/migraine syndrome, who (Temporal)   Resp 15   Ht 5' 5\" (1.651 m)   Wt 150 lb (68 kg)   SpO2 98%   BMI 24.96 kg/m²   General: Alert, no acute distress  Neck: dressing noted on left neck CDI  Lungs: clear to ausculation bilaterally  Heart: Regular rate and rhythm  Abdomen: soft,

## 2021-12-23 NOTE — PROGRESS NOTES
[facility] HOSP - [facility]     Vascular Surgery Progress Note    Mariza Sensor Patient Status:  Inpatient    1955 MRN [id number]   Location [facility] 2W/SW Attending Luz Vasquez MD   Hosp Day # 1 PCP Zora Tate MD     Objective: Oral, Q15 Min PRN   Or  dextrose 50 % injection 50 mL, 50 mL, Intravenous, Q15 Min PRN   Or  glucose (DEX4) oral liquid 30 g, 30 g, Oral, Q15 Min PRN   Or  glucose-vitamin C (DEX-4) chewable tab 8 tablet, 8 tablet, Oral, Q15 Min PRN  Insulin Aspart Pen (NO

## 2021-12-23 NOTE — PLAN OF CARE
Pt s/p lt carotid endarterectomy. Neuro remains intact. Lt neck dressing is c/d/I. Mild-moderate neck pain is manageable by prn tylenol. VSS. Art line d/cd.  Had an episode of vasovagal response at 0630 after pt went to the bathroom to urinate , pt became p Progressing

## 2021-12-24 VITALS
TEMPERATURE: 99 F | WEIGHT: 150 LBS | BODY MASS INDEX: 24.99 KG/M2 | RESPIRATION RATE: 12 BRPM | DIASTOLIC BLOOD PRESSURE: 69 MMHG | SYSTOLIC BLOOD PRESSURE: 139 MMHG | OXYGEN SATURATION: 98 % | HEIGHT: 65 IN | HEART RATE: 61 BPM

## 2021-12-24 LAB — GLUCOSE BLDC GLUCOMTR-MCNC: 167 MG/DL (ref 70–99)

## 2021-12-24 PROCEDURE — 82962 GLUCOSE BLOOD TEST: CPT

## 2021-12-24 NOTE — DISCHARGE SUMMARY
General Medicine Discharge Summary     Patient ID:  Get Rothman  77year old  8/18/1955    Admit date: 12/22/2021    Discharge date and time: 12/24/21    Attending Physician:  MD Donell Kingston nightly. aspirin 81 MG Oral Chew Tab  Chew 1 tablet (81 mg total) by mouth daily. SITagliptin Phosphate 100 MG Oral Tab  Take 100 mg by mouth daily. nightly    Empagliflozin (JARDIANCE) 25 MG Oral Tab  Take 25 mg by mouth daily.     metFORMIN HCl 1000

## 2021-12-24 NOTE — PROGRESS NOTES
Patient discharge home with family. Escorted out with RN via Wheel chair\  PIV removed. Discharge instructions and paper work given to patient and reviewed. All questions answered. Understood instruction.

## 2021-12-24 NOTE — PROGRESS NOTES
[facility] HOSP - [facility]     Vascular Surgery Progress Note    Toma Das Patient Status:  Inpatient    1955 MRN [id number]   Location [facility] 2W/SW Attending Edelmira Gonzalez MD   Hosp Day # 2 PCP Radha Guillaume MD     Objective: liquid 30 g, 30 g, Oral, Q15 Min PRN   Or  glucose-vitamin C (DEX-4) chewable tab 8 tablet, 8 tablet, Oral, Q15 Min PRN  Insulin Aspart Pen (NOVOLOG) 100 UNIT/ML flexpen 1-5 Units, 1-5 Units, Subcutaneous, TID CC and HS        Laboratory/Data:    LABS:  Re

## 2021-12-24 NOTE — PLAN OF CARE
Pt neuro status remains intact. Left neck dressing remains clean, dry, and intact. No complaints of pain during shift. Vitals stable throughout shift. Pt ambulating, tolerating well.  Safety measures implemented w/ call light within reach    Problem: Jordin

## 2021-12-24 NOTE — PLAN OF CARE
Patient just given tylenol for 4 out of 10 pain in neck and head. Rolled up towels support patient's neck. Dressing to LT neck C/D/I. Ambulates with stand by assist well. Froilan Wilkinson is hoping to go home today. Monitoring.     Problem: Patient Centered Care  Go

## 2021-12-24 NOTE — PLAN OF CARE
Patient Aox4. S/p left carotid endarterectomy POD #2. Hemodynamicallys stable.  Cleared to discharge today     Problem: Patient Centered Care  Goal: Patient preferences are identified and integrated in the patient's plan of care  Description: Interventions:

## 2021-12-27 NOTE — PAYOR COMM NOTE
Discharge Notification    Patient Name: Meme Guo: [address] [place] [address] #: [id number]  Authorization Number:  [id number].   Admit Date/Time: 12/22/2021 9:40 AM  Discharge Date/Time: 12/24/2021 12:45 PM

## 2021-12-30 LAB — ISTAT ACTIVATED CLOTTING TIME: 315 SECONDS (ref 125–137)

## 2022-07-19 NOTE — ED INITIAL ASSESSMENT (HPI)
Pt coming in with stroke like symptoms that started around 1700. Reports that she has numbing and tingling on the right side of her body. Son stated she had slurred speech that since resolved.
PAST MEDICAL HISTORY:  Prostate cancer Prostatectomy    Sciatica, unspecified laterality     Seasonal allergic rhinitis, unspecified allergic rhinitis trigger     Sleep apnea, unspecified type Oral device. No CPAP Machine    Spinal stenosis of lumbar region     Spondylosis of lumbosacral region, unspecified spinal osteoarthritis complication status     Urethral stricture, unspecified stricture type     Varicose veins left lower extremity

## 2024-03-17 ENCOUNTER — APPOINTMENT (OUTPATIENT)
Dept: MRI IMAGING | Facility: HOSPITAL | Age: 69
End: 2024-03-17
Attending: EMERGENCY MEDICINE
Payer: MEDICARE

## 2024-03-17 ENCOUNTER — APPOINTMENT (OUTPATIENT)
Dept: CT IMAGING | Facility: HOSPITAL | Age: 69
End: 2024-03-17
Attending: EMERGENCY MEDICINE
Payer: MEDICARE

## 2024-03-17 ENCOUNTER — HOSPITAL ENCOUNTER (EMERGENCY)
Facility: HOSPITAL | Age: 69
Discharge: HOME | End: 2024-03-17
Attending: EMERGENCY MEDICINE
Payer: MEDICARE

## 2024-03-17 VITALS
SYSTOLIC BLOOD PRESSURE: 141 MMHG | RESPIRATION RATE: 14 BRPM | OXYGEN SATURATION: 98 % | BODY MASS INDEX: 25 KG/M2 | DIASTOLIC BLOOD PRESSURE: 62 MMHG | HEART RATE: 76 BPM | WEIGHT: 147.5 LBS | TEMPERATURE: 98 F

## 2024-03-17 DIAGNOSIS — R07.9 CHEST PAIN, UNSPECIFIED TYPE: ICD-10-CM

## 2024-03-17 DIAGNOSIS — M50.20 HERNIATED DISC, CERVICAL: ICD-10-CM

## 2024-03-17 DIAGNOSIS — R20.2 ARM PARESTHESIA, LEFT: Primary | ICD-10-CM

## 2024-03-17 DIAGNOSIS — M54.2 NECK PAIN: ICD-10-CM

## 2024-03-17 LAB
ANION GAP SERPL CALC-SCNC: 7 MMOL/L (ref 0–18)
ATRIAL RATE: 72 BPM
BASOPHILS # BLD AUTO: 0.03 X10(3) UL (ref 0–0.2)
BASOPHILS NFR BLD AUTO: 0.4 %
BUN BLD-MCNC: 11 MG/DL (ref 9–23)
BUN/CREAT SERPL: 12.8 (ref 10–20)
CALCIUM BLD-MCNC: 10.4 MG/DL (ref 8.7–10.4)
CHLORIDE SERPL-SCNC: 104 MMOL/L (ref 98–112)
CO2 SERPL-SCNC: 28 MMOL/L (ref 21–32)
CREAT BLD-MCNC: 0.86 MG/DL
DEPRECATED RDW RBC AUTO: 41 FL (ref 35.1–46.3)
EGFRCR SERPLBLD CKD-EPI 2021: 74 ML/MIN/1.73M2 (ref 60–?)
EOSINOPHIL # BLD AUTO: 0.26 X10(3) UL (ref 0–0.7)
EOSINOPHIL NFR BLD AUTO: 3.7 %
ERYTHROCYTE [DISTWIDTH] IN BLOOD BY AUTOMATED COUNT: 12.7 % (ref 11–15)
GLUCOSE BLD-MCNC: 120 MG/DL (ref 70–99)
GLUCOSE BLDC GLUCOMTR-MCNC: 118 MG/DL (ref 70–99)
HCT VFR BLD AUTO: 42 %
HGB BLD-MCNC: 13.7 G/DL
IMM GRANULOCYTES # BLD AUTO: 0.01 X10(3) UL (ref 0–1)
IMM GRANULOCYTES NFR BLD: 0.1 %
LYMPHOCYTES # BLD AUTO: 2.64 X10(3) UL (ref 1–4)
LYMPHOCYTES NFR BLD AUTO: 37.7 %
MCH RBC QN AUTO: 28.6 PG (ref 26–34)
MCHC RBC AUTO-ENTMCNC: 32.6 G/DL (ref 31–37)
MCV RBC AUTO: 87.7 FL
MONOCYTES # BLD AUTO: 0.82 X10(3) UL (ref 0.1–1)
MONOCYTES NFR BLD AUTO: 11.7 %
NEUTROPHILS # BLD AUTO: 3.25 X10 (3) UL (ref 1.5–7.7)
NEUTROPHILS # BLD AUTO: 3.25 X10(3) UL (ref 1.5–7.7)
NEUTROPHILS NFR BLD AUTO: 46.4 %
OSMOLALITY SERPL CALC.SUM OF ELEC: 289 MOSM/KG (ref 275–295)
P AXIS: 60 DEGREES
P-R INTERVAL: 144 MS
PLATELET # BLD AUTO: 213 10(3)UL (ref 150–450)
POTASSIUM SERPL-SCNC: 4.3 MMOL/L (ref 3.5–5.1)
Q-T INTERVAL: 392 MS
QRS DURATION: 82 MS
QTC CALCULATION (BEZET): 429 MS
R AXIS: 12 DEGREES
RBC # BLD AUTO: 4.79 X10(6)UL
SODIUM SERPL-SCNC: 139 MMOL/L (ref 136–145)
T AXIS: 44 DEGREES
TROPONIN I SERPL HS-MCNC: 4 NG/L
TROPONIN I SERPL HS-MCNC: <3 NG/L
VENTRICULAR RATE: 72 BPM
WBC # BLD AUTO: 7 X10(3) UL (ref 4–11)

## 2024-03-17 PROCEDURE — 70496 CT ANGIOGRAPHY HEAD: CPT | Performed by: EMERGENCY MEDICINE

## 2024-03-17 PROCEDURE — 96361 HYDRATE IV INFUSION ADD-ON: CPT

## 2024-03-17 PROCEDURE — 71260 CT THORAX DX C+: CPT | Performed by: EMERGENCY MEDICINE

## 2024-03-17 PROCEDURE — 82962 GLUCOSE BLOOD TEST: CPT

## 2024-03-17 PROCEDURE — 96360 HYDRATION IV INFUSION INIT: CPT

## 2024-03-17 PROCEDURE — 70450 CT HEAD/BRAIN W/O DYE: CPT | Performed by: EMERGENCY MEDICINE

## 2024-03-17 PROCEDURE — 99285 EMERGENCY DEPT VISIT HI MDM: CPT

## 2024-03-17 PROCEDURE — 70551 MRI BRAIN STEM W/O DYE: CPT | Performed by: EMERGENCY MEDICINE

## 2024-03-17 PROCEDURE — 80048 BASIC METABOLIC PNL TOTAL CA: CPT | Performed by: EMERGENCY MEDICINE

## 2024-03-17 PROCEDURE — 85025 COMPLETE CBC W/AUTO DIFF WBC: CPT | Performed by: EMERGENCY MEDICINE

## 2024-03-17 PROCEDURE — 93010 ELECTROCARDIOGRAM REPORT: CPT

## 2024-03-17 PROCEDURE — 93005 ELECTROCARDIOGRAM TRACING: CPT

## 2024-03-17 PROCEDURE — 70498 CT ANGIOGRAPHY NECK: CPT | Performed by: EMERGENCY MEDICINE

## 2024-03-17 PROCEDURE — 84484 ASSAY OF TROPONIN QUANT: CPT | Performed by: EMERGENCY MEDICINE

## 2024-03-17 RX ORDER — CLOPIDOGREL BISULFATE 75 MG/1
75 TABLET ORAL DAILY
Qty: 30 TABLET | Refills: 0 | Status: SHIPPED | OUTPATIENT
Start: 2024-03-17 | End: 2024-04-16

## 2024-03-17 NOTE — DISCHARGE INSTRUCTIONS
Thank you for seeking care at [facility] Emergency Department.    You have been seen and evaluated for chest pain, numbness in the left arm and left sided neck pain.      We discussed the results of your workup - your CT scans and MRI did not show a stroke but did show some narrowing of your carotid (neck) arteries which you were already aware of. The Neurologist would like you to start the medicine Plavix that helps prevent stroke and would like you to follow up with them within 7 days. Your CT scan also showed a C5-6 herniated disc on the left side which may be related to the symptoms today, so please follow up with the spine specialist on these discharge instructions within 1 week. Please also follow up with your vascular team as well regarding the narrowed carotid (neck) arteries.    Please read the instructions provided   If given prescriptions, take as instructed    Remember, your care process does not end after your visit today. Please follow-up with your doctor within 1-2 days for a follow-up check to ensure you are  improving, to see if you need any further evaluation/testing, or to evaluate for any alternate diagnoses.     Please return to the emergency department immediately if you develop chest pain, difficulty breathing, inability to drink liquids without vomiting, one sided numbness or weakness, slurred speech, severe headache, or if you develop any other new or concerning symptoms as these could be signs of more serious medical illness.    We hope you feel better.

## 2024-03-17 NOTE — ED QUICK NOTES
.Patient cleared for discharge by ED MD. Patient verbalizes understanding of discharge instructions and prescriptions. Patient ambulatory upon discharge.

## 2024-03-17 NOTE — ED QUICK NOTES
0259- Stroke alert called    0304- This RN to CT with pt.     0234- This RN back to room with pt.     0333- Pt care handed off to NEL Costa. Care endorsed.

## 2024-03-17 NOTE — ED PROVIDER NOTES
[place] Emergency Department Note  Patient: Talisha Pope Age: 68 year old Sex: female      MRN: [id number]  : 1955    Patient Seen in: [facility] Emergency Department    History     Chief Complaint   Patient presents with    Numbness Weakness    Dizziness     Stated Complaint: L arm pain, numbness    History obtained from: patient, daughter     This is a 68-year-old history of diabetes, hypertension, hyperlipidemia, prior left carotid stenosis status post endarterectomy a couple of months ago, presented to the ER with complaint of pain numbness and tingling in her left arm onset around 11:30pm last night 3/16, and then developed some pain in the left side of her neck associated with this. Denies SOB, fever, or cough. No other numbness/tingling/weakness elsewhere. States since coming to the ED also is now experiencing midsternal chest pain, nonradiating, associated with her symptoms. Denies abd pain, n/v/d, leg swelling or other complaints. Denies any preceding trauma tonight or other activities prior to onset of symptoms, though daughter states pt tripped slightly a couple of days ago but did not hit her head or pass out.     Review of Systems:  Review of Systems  Positive for stated complaint: L arm pain, numbness. Constitutional and vital signs reviewed. All other systems reviewed and negative except as noted above.    Patient History:  Past Medical History:   Diagnosis Date    Diabetes (HCC)     High blood pressure     Hyperlipidemia     Hypertension     Migraine        Past Surgical History:   Procedure Laterality Date    REMOVAL OF HEEL SPUR          No family history on file.    Specific Social Determinants of Health:   Social History     Socioeconomic History    Marital status:    Tobacco Use    Smoking status: Never    Smokeless tobacco: Never   Vaping Use    Vaping Use: Never used   Substance and Sexual Activity    Alcohol use: Not Currently    Drug use: Never           PSFH elements  reviewed from today and agreed except as otherwise stated in HPI.    Physical Exam     ED Triage Vitals [03/17/24 0238]   BP (!) 168/81   Pulse 77   Resp 18   Temp 97.8 °F (36.6 °C)   Temp src Oral   SpO2 99 %   O2 Device None (Room air)       Current:/62   Pulse 76   Temp 97.8 °F (36.6 °C) (Oral)   Resp 14   Wt 66.9 kg   SpO2 98%   BMI 24.54 kg/m²         Physical Exam  Vitals and nursing note reviewed.   Constitutional:       General: She is not in acute distress.     Appearance: She is not ill-appearing.   HENT:      Head: Normocephalic and atraumatic.      Right Ear: External ear normal.      Left Ear: External ear normal.      Nose: Nose normal.      Mouth/Throat:      Mouth: Mucous membranes are moist.   Eyes:      Extraocular Movements: Extraocular movements intact.      Conjunctiva/sclera: Conjunctivae normal.      Pupils: Pupils are equal, round, and reactive to light.   Cardiovascular:      Rate and Rhythm: Normal rate and regular rhythm.      Heart sounds: No murmur heard.     Comments: 2+ radial and DP pulses bilaterally   Pulmonary:      Effort: Pulmonary effort is normal. No respiratory distress.      Breath sounds: No wheezing or rales.   Abdominal:      General: There is no distension.      Palpations: Abdomen is soft.      Tenderness: There is no abdominal tenderness. There is no guarding or rebound.   Musculoskeletal:         General: No swelling or deformity.   Skin:     General: Skin is warm and dry.      Capillary Refill: Capillary refill takes less than 2 seconds.   Neurological:      General: No focal deficit present.      Mental Status: She is alert and oriented to person, place, and time.      GCS: GCS eye subscore is 4. GCS verbal subscore is 5. GCS motor subscore is 6.      Cranial Nerves: No cranial nerve deficit, dysarthria or facial asymmetry.      Comments: Strength 5/5 bilat UE and LE prox and distally with SILT bilat UE and LE prox and distally. FNF intact bilaterally.             ED Course   Labs:   Labs Reviewed   BASIC METABOLIC PANEL (8) - Abnormal; Notable for the following components:       Result Value    Glucose 120 (*)     All other components within normal limits   POCT GLUCOSE - Abnormal; Notable for the following components:    POC Glucose  118 (*)     All other components within normal limits   TROPONIN I HIGH SENSITIVITY - Normal   TROPONIN I HIGH SENSITIVITY - Normal   CBC WITH DIFFERENTIAL WITH PLATELET    Narrative:     The following orders were created for panel order CBC With Differential With Platelet.  Procedure                               Abnormality         Status                     ---------                               -----------         ------                     CBC W/ DIFFERENTIAL[[id number]]                              Final result                 Please view results for these tests on the individual orders.   RAINBOW DRAW LAVENDER   RAINBOW DRAW LIGHT GREEN   RAINBOW DRAW BLUE   RAINBOW DRAW GOLD   CBC W/ DIFFERENTIAL     Radiology findings:  I personally reviewed the images.   No results found.      Cardiac Monitor: Interpreted by me.   Pulse Readings from Last 1 Encounters:   03/17/24 76   , sinus,     Comment: My interpretation of ECG shows sinus rhythm with PACs, rate 72 bpm, normal axis, normal intervals, incomplete RBBB, no stemi     MDM   This patient presents with left arm numbness/tingling/weakness and neck pain onset since last night.   NIHSS of 0 though pt endorses paresthesias in the left arm she is able to sense light touch and is symmetric bilaterally, no pronator drift   Ddx: TIA, less likely CVA, carotid stenosis, dissection, aortic dissection, less likely ACS/MI, electrlyte abnormality, anemia, cervical spinal stenosis/radiculopathy also considered   Plan: given pt hx of stroke and onset of sx new for her will obtain CTA head and neck, CTH, rapid MRI, as well as CTA chest to eval for dissection, as well as cbc, cmp, trop x2, ecg.      ED Course as of 03/17/24 0757  ------------------------------------------------------------  Time: 03/17 0319  Value: POC GLUCOSE(!): 118  Comment: (Reviewed)  ------------------------------------------------------------  Time: 03/17 0319  Comment: Case d/w Dr. Burton with neurology agrees pt not a TNK candidate right now given NIHSS of 0 however if CTH negative for bleed would agree with adding plavix.   ------------------------------------------------------------  Time: 03/17 0331  Comment: Case d/w [facility] radiologist has small C5-6 disc protrusion on the left mild canal stenosis. Noncon CTH negative, has moderate left ICA intracranial stenosis similar to prior MRI from 2021 and has chronic appearing L MCA stroke that is not acute. No dissection or PE   ------------------------------------------------------------  Time: 03/17 0349  Comment: Cbc unremarkable.   ------------------------------------------------------------  Time: 03/17 0349  Value: CT STROKE CTA BRAIN/CTA NECK (W IV)(CPT=70496/46078)  Comment: CT HEAD WITHOUT IV CONTRAST  CT ANGIOGRAM HEAD WITH IV CONTRAST  CT ANGIOGRAM NECK WITH IV CONTRAST  CT ANGIOGRAM CHEST WITH IV CONTRAST    Comparison 12/4/2021 CTA head and neck    IMPRESSION:  CT HEAD:  -No evidence of acute intracranial abnormality. If there remains a concern for acute infarct, consider MRI.  -No acute calvarial fracture.  -Encephalomalacia left posterior temporal lobe.      CTA HEAD:  -No significant change in the moderate stenoses of the left intracranial ICA.  -No evidence of acute LVO or intracranial aneurysm.      CTA NECK:  -No significant change in the 50-75% stenosis of the right ICA proximally and 50% stenosis of the right carotid bulb.  -Interval left CEA.  -Bilateral vertebral arteries are patent.  -C5-6 focal left paracentral disc protrusion results in mild spinal canal stenosis and may be new from the prior study (series 10 image 89).  Consider nonemergent cervical spine MRI  for further characterization as clinically warranted.      CTA CHEST:  -No evidence of acute abnormality.  -No evidence of thoracic aortic aneurysm or dissection although sensitivity in the ascending aorta is decreased by cardiac motion artifact.  If there remains a high clinical concern for ascending aortic dissection, consider cardiac gated CTA.  -No evidence of pulmonary embolism.  -No focal consolidation or pleural effusions.    ------------------------------------------------------------  Time: 03/17 0418  Comment: Trop undetectable, bmp normal   ------------------------------------------------------------  Time: 03/17 0426  Comment: Family updated with workup so far and neuro recs   ------------------------------------------------------------  Time: 03/17 0557  Value: MRI BRAIN WO ACUTE (3) SEQUENCE (CPT=70551)  Comment: Case d/w radiologist no flair or evidence of bleed, mass or stroke   ------------------------------------------------------------  Time: 03/17 0605  Comment: Patient and/or guarding updated with all results including incidental findings of carotid stenosis, L sided C spine disc protrusion, all questions answered. Pt sts feeling much better at this time and symptoms have resolved. Counseled on outpatient primary care follow up within 2-3 days and neurology subspecialty follow up within 7 days as well as f/u with NSGY regarding disc herniation and vascular surgery regarding her carotid stenosis. Counseled extensively on strict return precautions. Patient and/or guarding verbalized understanding and are comfortable with the plan for discharge at this time.    ------------------------------------------------------------  Time: 03/17 0606  Comment: My interpretation of ECG shows sinus rhythm with PACs, rate 72 bpm, normal axis, normal intervals, incomplete RBBB   ------------------------------------------------------------  Time: 03/17 0719  Comment: Trop normal              Procedures:  Procedures        Disposition and Plan     Clinical Impression:  1. Arm paresthesia, left    2. Neck pain    3. Chest pain, unspecified type    4. Herniated disc, cervical        Disposition:  Discharge    Follow-up:  Carlee Michelle MD  [address] [facility] [id number] [phone]    Schedule an appointment as soon as possible for a visit in 2 day(s)      [facility] Emergency Department  [address]  [facility] [address]  [phone]  Go to  If symptoms worsen, immediately    Karina Burton DO  [address] [facility] [address]  [facility] [address]  [phone]    Schedule an appointment as soon as possible for a visit in 1 week(s)      Sisi Hampton MD  [address] [facility] [address]  [facility] [address]  [phone]    Schedule an appointment as soon as possible for a visit in 1 week(s)      Christopher Mix MD  [address] [place] [address]  [facility] [id number] [phone]    Schedule an appointment as soon as possible for a visit in 1 week(s)  As needed for neck pain and your neck herniated disc      Medications Prescribed:  Discharge Medication List as of 3/17/2024  7:32 AM        START taking these medications    Details   clopidogrel 75 MG Oral Tab Take 1 tablet (75 mg total) by mouth daily., Normal, Disp-30 tablet, R-0               This note may have been created using voice dictation technology and may include inadvertent errors.      Teresa Shin DO  Attending Physician   Emergency Medicine

## 2024-03-17 NOTE — ED INITIAL ASSESSMENT (HPI)
Pt c/o left arm pain/ pinching nerve, numbness, left side neck stiffness and dizziness that started around 11:30pm last night.

## 2024-03-20 ENCOUNTER — TELEPHONE (OUTPATIENT)
Dept: ORTHOPEDICS CLINIC | Facility: CLINIC | Age: 69
End: 2024-03-20

## 2024-03-20 NOTE — TELEPHONE ENCOUNTER
LMOM for pt to call office to schedule appt with Maria Del Rosario Gonzalez confirm pt is coming in for cervical and pls route to clinical pool when pt calls back to schedule

## 2024-03-27 NOTE — TELEPHONE ENCOUNTER
Contacted pt       Pt stated she has meant to call our office back however, she did seek treatment elsewhere.

## 2024-05-23 ENCOUNTER — TELEPHONE (OUTPATIENT)
Dept: NEUROLOGY | Facility: CLINIC | Age: 69
End: 2024-05-23

## 2024-05-23 NOTE — TELEPHONE ENCOUNTER
Pt was given a hsp f/up due to provider not being available on 5/29 to 7/8 @ 1pm in the [place] office. Pt was called to confirm the switch. Pt asked if she could instead see the provider in [place]. Please advise a different date for her hospital f/up

## 2024-07-17 ENCOUNTER — OFFICE VISIT (OUTPATIENT)
Dept: NEUROLOGY | Facility: CLINIC | Age: 69
End: 2024-07-17
Payer: COMMERCIAL

## 2024-07-17 VITALS — BODY MASS INDEX: 24.49 KG/M2 | WEIGHT: 147 LBS | HEIGHT: 65 IN

## 2024-07-17 DIAGNOSIS — I67.9 CEREBROVASCULAR DISEASE: Primary | ICD-10-CM

## 2024-07-17 DIAGNOSIS — M47.812 OSTEOARTHRITIS OF CERVICAL SPINE, UNSPECIFIED SPINAL OSTEOARTHRITIS COMPLICATION STATUS: ICD-10-CM

## 2024-07-17 DIAGNOSIS — G25.81 RESTLESS LEG: ICD-10-CM

## 2024-07-17 PROBLEM — E11.649 TYPE 2 DIABETES MELLITUS WITH HYPOGLYCEMIA WITHOUT COMA, WITHOUT LONG-TERM CURRENT USE OF INSULIN (HCC): Chronic | Status: ACTIVE | Noted: 2024-07-17

## 2024-07-17 PROBLEM — E11.9 DIABETES MELLITUS, TYPE 2 (HCC): Chronic | Status: ACTIVE | Noted: 2024-07-17

## 2024-07-17 PROCEDURE — 99214 OFFICE O/P EST MOD 30 MIN: CPT | Performed by: OTHER

## 2024-07-17 PROCEDURE — G2211 COMPLEX E/M VISIT ADD ON: HCPCS | Performed by: OTHER

## 2024-07-17 PROCEDURE — 1159F MED LIST DOCD IN RCRD: CPT | Performed by: OTHER

## 2024-07-17 PROCEDURE — 1160F RVW MEDS BY RX/DR IN RCRD: CPT | Performed by: OTHER

## 2024-07-17 PROCEDURE — 3008F BODY MASS INDEX DOCD: CPT | Performed by: OTHER

## 2024-07-17 PROCEDURE — 1111F DSCHRG MED/CURRENT MED MERGE: CPT | Performed by: OTHER

## 2024-07-17 RX ORDER — CONJUGATED ESTROGENS 0.62 MG/G
CREAM VAGINAL
COMMUNITY
Start: 2024-03-14

## 2024-07-17 RX ORDER — ERGOCALCIFEROL 1.25 MG/1
50000 CAPSULE ORAL WEEKLY
COMMUNITY

## 2024-07-17 RX ORDER — FLUCONAZOLE 200 MG/1
200 TABLET ORAL WEEKLY
COMMUNITY
Start: 2024-07-08

## 2024-07-17 NOTE — PROGRESS NOTES
[facility] [address], [address]  [facility] [address]  [phone]        Neurology Clinic Follow Up Note    Chief Complaint:  Hospital F/U (LOV 12/14/2021 Pt presents today with stenosis of left internal carotid artery. Pt states she was seen in hospital for weakness ,Arm paresthesia, left neck /chest pain , and slurred speech.Pt was in hospital 03/ 17/2024.Pt has brought her hard copy of MRI findings with her.)      HPI:   Talisha Pope is here in follow up for moderate C5-C6 stenossis.    3.16.2024  at 11 PM - left arm pain. Pinching pain in her left biceps.  She had pressure in her chest in the ED while they were drawing her blood. No jaw pain. While in the arm she reports       07/17/24 Interval History/Subjective :   Discussed need for PT  Discussed RF for dementia  She needs to check her bp at home  No sx of a mood disorder       ROS: Pertinent positive and negatives per HPI.  All others were reviewed and negative.     Medications:  Current Outpatient Medications   Medication Instructions    aspirin 81 mg, Oral, Daily    atorvastatin (LIPITOR) 80 mg, Oral, Nightly    ergocalciferol (VITAMIN D2) 50,000 Units, Oral, Weekly    fluconazole (DIFLUCAN) 200 mg, Oral, Weekly    hyoscyamine 0.125 MG Sublingual SL Tab 1 tablet, Sublingual, Every 4 hours PRN    Jardiance 25 mg, Oral, Daily    lisinopril (ZESTRIL) 10 mg, Oral, Every morning    metFORMIN HCl (GLUCOPHAGE) 850 mg, Oral, 2 times daily with meals    PREMARIN 0.625 MG/GM Vaginal Cream INSERT 1 GRAM VAGINALLY 3 TIMES PER WEEK AS DIRECTED    SITagliptin Phosphate (JANUVIA) 100 mg, Oral, Daily, nightly        Reviewed and assessed      Objective:  Last vitals and weight :  Body mass index is 24.46 kg/m².   There were no vitals filed for this visit.   Height 65\", weight 147 lb (66.7 kg).  Ht 65\"   Wt 147 lb (66.7 kg)   BMI 24.46 kg/m²   Exam:  - General: appears stated age and no distress  - CV: S1, S2 normal, no murmur, click,  rub or gallop, regular rate and rhythm.   Carotids:   - Pulmonary: Normal excursion of the chest.  No signs of respiratory distress.  Neurologic Exam  - Mental Status: Alert and attentive.  Oriented x4.  Speech is spontaneous, fluent, and prosodic. Comprehension and repetition intact. Phrase length and rate are normal. No paraphasic errors, neologisms, anomia, acalculia, apraxia, anosognosia, or R/L confusion.   - Cranial Nerves: No gaze preference. Visual fields:normal  Pupils are 4mm briskly constricting to 3mm and equally round and reactive to light  in a well lit room. No rAPD. EOMI. No nystagmus. No ptosis. V1-V3 intact B/L to light touch.No pathological facial asymmetry. No flattening of the nasolabial fold. .  Hearing grossly intact.  Tongue midline. No atrophy or fasiculations of the tongue noted. Palate and uvula elevate symmetrically.  Shoulder shrug symmetric.  - Fundoscopic exam:normal w/o hemorrhages, exudates, or papilledema.No attenuation. No pallor.  - Motor:  normal tone/bulk. No interosseous wasting. No flattening of hypothenar eminences.   Motor Strength    Pronator drift: No pronator drift   Arm Rolling: No orbiting.   Finger Taps: Finger taps are symmetric in rate and amplitude.    Rapid movements: symmetric. No fatiguing.   Right Left     Motor Strength   Deltoids 5 5  Triceps 5 5  Biceps 5 5   5 5   Hip Flexors 5 5   Knee extensors 5 5  Knee flexors 5 5      Foot Taps:    Asterixis: No asterixis noted.   Tremor:       Reflexes:    C5 C6 C7  L4 S1   R 2+ 2+  2+ 2+   L 2+ 2+  2+ 2+    Frontal release signs:Not assessed.    Jaw Jerk:    Wilmer's sign:absent   Nonsustained clonus: Absent   Sustained clonus: Absent   - Sensory:   Light touch: normal  Temperature:normal  Pinprick:   Vibration: normal  Proprioception:      Sensory level:      Romberg:   - Cerebellum: No truncal ataxia. No titubations. No dysmetria, no dysdiadochokinesis. No rebound.   - Gait/station: Normal gait and station.  Symmetric arm swing.   - Toe walking: normal  - Heel walking: normal  - Plantar response: flexor bilaterally    Exam is unchanged on 07/17/24    Most recent lab results:   Diabetes:    Lab Results   Component Value Date    A1C 7.3 (H) 12/03/2021     (H) 12/03/2021     Lab Results   Component Value Date    CHOLEST 154 12/04/2021     Lab Results   Component Value Date    HDL 76 (H) 12/04/2021     Lab Results   Component Value Date    LDL 64 12/04/2021     Lab Results   Component Value Date    TRIG 69 12/04/2021   No results found for: \"AST\"No results found for: \"ALT\"    Reviewed and assessed       Diagnostic studies:  Performed an independent visualization of  mri brain from 2024 and 2021 and cta head and neck  Imaging revealed:  agree w/ read        Assessment     Discussed secondary stroke prevention and preventative measures for dementia.   No indication for PT at this time.       Plan   1.history of silent infarct   Diagnostics:  none  Therapeutics  Cont antithrombotics, asa 81mg and high intensity statin, Cholesterol Meds: atorvastatin Tabs - 80 MG   Home BP monitoring. Pt instructed to check BP at home in the AM and PM, and bring values to future clinic visits. BP goal is for normotension, < 130/80.  HbA1c goal <7.0  LDL-C goal  <70 mg/dL.   Frequent exercise as per AHA/ASA recs (30 min of aerobic exercise, 5 times per week).  If snoring, consider referral for sleep study for possible MALIK.  Recommend DASH diet. A diet that is rich in fruits and vegetables and thereby high in potassium is beneficial. Recommend reduced intake of sodium and increased intake of potassium, increase consumption of fruits, vegetables, and low-fat dairy products and  decreased consumption of saturated fat.       2.cervical disk disease  Diagnostics:  none  Therapeutics   PT if necessary     3. Concern for dementia  Diagnostics:  Reversible dementia labs if not already ordered (  B12 with reflex MMA, thiamine, TSH, RPR)  Audiology  referral if no recent evaluation   Therapeutics:  Avoid anticholinergic medications in patients with mild cognitive impairment.  In particular the following medications which have a high anticholinergic activity:  Antihistamines including doxylamine, hydroxyzine, meclizine.  TCAs: amitriptyline, clomipramine, desipramine, doxepin, imipramine, nortriptyline.  First generation antipsychotics and clozapine.  Muscle relaxants: Tizanidine (lower anticholinergic affect is seen w/ cyclobenzaprine, baclofen, and methocarbamol.)  Antiemetics:  hydroxyzine, meclizine, promethazine, scopolamine.  Antispasmodics including atropine, clozapine, hycoasmine, glycopyrrolate.  Medications for overactive bladder including darifenacin oxybutynin, solifenacin (vesicare), tolterodine  Consider stopping other neurologic agents that have low anticholinergic activity including carbamazepine, lithium, nefazodone, oxcarbazepine, phenelzine, and trazodone.    Treat contributing factors/comorbid conditions:  Visual impairment:. The American Academy of Ophthalmology recommends comprehensive eye exam every one to three years for those aged 55 to 64, and every year or two after the age of 65  Hearing loss:  referred to audiology.The American Speech-Language-Hearing-Association recommends a hearing test  q3 years after the age of 50  Hepatic impairment:   Renal impairment:   Depression:   Sleep disturbances:  does not snore     3. Non-medication management:  Any sudden changes in memory, thinking, or behavior warrant prompt follow up with a primary care provider to rule out any medical conditions.  Stroke prevention strategies are important for brain health and include: blood pressure control, diabetes management, cholesterol reduction, and smoking cessation.  Regular aerobic and anaerobic exercise and a balanced and nutritious diet such as the Mediterranean and the MIND diet  Social and cognitively engaging activities   Fall risk reduction  Good  sleep hygiene;see above.   We encouraged completion of the documents power of  for health and financial decisions if not already done     4. Many patients with dementia experience changes in mood including depression, anxiety or changes in personality and behavior. Non-pharmacologic approaches are the recommended first line treatment for these symptoms. If non-pharmacologic approaches are unsuccessful, we recommend a serotonin reuptake inhibitor such as escitalopram (Lexapro) or citalopram (Celexa) starting at the lowest possible dose and titrating to efficacy. It is important to remember that SSRIs have been associated with QTc prolongation and thus, monitoring the EKG is advised. If treatment unsuccessful, we recommend establishing care with a provider who specializes in managing behavior changes in dementia such as a geriatric psychiatrist. We are happy to provide recommendations for providers as needed.     5. Restless leg  Rls labs              This document is not intended to support charting by exception.  Sections left blank in a completed note should be presumed not to have been done.      Disclaimer:   This record was dictated using  Dragon software. There may be errors due to voice recognition problems that were not realized and corrected during the completion of the note.           Thank you for allowing me to participate in the care of your patient.    Fortino Brooke DO  7/17/2024

## 2024-07-17 NOTE — ADDENDUM NOTE
Addended by: MAXWELL BERNAL on: 7/17/2024 03:43 PM     Modules accepted: Orders    
15-Nov-2018 08:47
15-Nov-2018 16:03

## 2024-07-17 NOTE — PATIENT INSTRUCTIONS
Make sure your LDL is less than 70.  Your A1c should be less than 7.0  Check your blood pressure at home      B.E. F.A.S.T.    B: Balance-- sudden loss  of balance, staggering gait, severe vertigo        E: Eyes-- sudden loss of vision in one or both eyes, onset of double vision        F: Face-- uneven or drooping face, drooling, ask the patient to smile        A: Arm (leg)-- loss of strength or sensation on one side of the body in the arm and/or leg        S: Speech-- slurring of speech, difficulty  saying words or understanding what is being said, sudden confusion        T: Terrible headache (time*)-- very severe headache which has maximum intensity within seconds to a minute        * Time of symptom onset and last known well times are important when determining what treatment is appropriate for an individual's stroke, particularly since treatment is time limited.  Time is not a symptom or sign of stroke.  Traditionally, Time was used for the \"T\" in the FAST and BEFAST acronyms for stroke awareness. Since terrible headache can be a symptom of stroke this is substituted.  However, as the acronym B.E. F.A.S.T. suggests, acting quickly is of critical importance after stroke is suspected. Knowing the symptom onset time or time when last well will have an impact on what treatments can be offered safely.

## (undated) DEVICE — HEMOCLIP HORIZON SM MULTI

## (undated) DEVICE — INTENDED TO BE USED TO OCCLUDE, RETRACT AND IDENTIFY ARTERIES, VEINS, TENDONS AND NERVES IN SURGICAL PROCEDURES: Brand: STERION®  VESSEL LOOP

## (undated) DEVICE — HEMOCLIP MED 24 CLIP/CARTRIDGE

## (undated) DEVICE — GAMMEX® PI HYBRID SIZE 6, STERILE POWDER-FREE SURGICAL GLOVE, POLYISOPRENE AND NEOPRENE BLEND: Brand: GAMMEX

## (undated) DEVICE — SUTURE VICRYL 3-0 SH

## (undated) DEVICE — GAMMEX® PI HYBRID SIZE 6.5, STERILE POWDER-FREE SURGICAL GLOVE, POLYISOPRENE AND NEOPRENE BLEND: Brand: GAMMEX

## (undated) DEVICE — SENSOR SOMASENSOR ADULT

## (undated) DEVICE — SUTURE PROLENE 5-0 C-1

## (undated) DEVICE — SUCTION CANISTER, 3000CC,SAFELINER: Brand: DEROYAL

## (undated) DEVICE — CLAMP INSERT: Brand: SOFT/TRACTION CLAMP INSERTS

## (undated) DEVICE — SUTURE PROLENE 7-0 BV-1

## (undated) DEVICE — ADHESIVE MASTISOL 2/3CC VL

## (undated) DEVICE — CV: Brand: MEDLINE INDUSTRIES, INC.

## (undated) DEVICE — SUTURE SILK 2-0 SA65H

## (undated) DEVICE — SOL  .9 1000ML BAG

## (undated) DEVICE — ABSORBABLE HEMOSTAT (OXIDIZED REGENERATED CELLULOSE, U.S.P.): Brand: SURGICEL

## (undated) DEVICE — SUTURE PROLENE 6-0 C-1

## (undated) DEVICE — SUTURE SILK 4-0 SA63H

## (undated) DEVICE — DECANTER BAG 9": Brand: MEDLINE INDUSTRIES, INC.

## (undated) DEVICE — STANDARD HYPODERMIC NEEDLE,POLYPROPYLENE HUB: Brand: MONOJECT

## (undated) DEVICE — CHLORAPREP 26ML APPLICATOR

## (undated) DEVICE — FLOSEAL HEMOSTATIC MATRIX, 5ML: Brand: FLOSEAL HEMOSTATIC MATRIX

## (undated) DEVICE — NEEDLE HPO 18GA 1.5IN ECLPS

## (undated) DEVICE — COVER CAMERA LIGHT HANDLE

## (undated) DEVICE — 3M™ IOBAN™ 2 ANTIMICROBIAL INCISE DRAPE 6650EZ: Brand: IOBAN™ 2

## (undated) DEVICE — SOL  .9 1000ML BTL

## (undated) DEVICE — SUNDT™ EXTERNAL CAROTID ENDARTERECTOMY SHUNT: Brand: SUNDT™

## (undated) DEVICE — COVER SGL STRL LGHT HNDL BLU

## (undated) DEVICE — SUTURE SILK 3-0 SA64H

## (undated) DEVICE — TOWEL SURG OR 17X26IN WHITE

## (undated) DEVICE — 3M™ STERI-STRIP™ REINFORCED ADHESIVE SKIN CLOSURES, R1547, 1/2 IN X 4 IN (12 MM X 100 MM), 6 STRIPS/ENVELOPE: Brand: 3M™ STERI-STRIP™

## (undated) DEVICE — SUTURE MONOCRYL 4-0 Y845G